# Patient Record
Sex: FEMALE | Race: BLACK OR AFRICAN AMERICAN | Employment: PART TIME | URBAN - METROPOLITAN AREA
[De-identification: names, ages, dates, MRNs, and addresses within clinical notes are randomized per-mention and may not be internally consistent; named-entity substitution may affect disease eponyms.]

---

## 2021-07-25 ENCOUNTER — HOSPITAL ENCOUNTER (EMERGENCY)
Age: 26
Discharge: HOME | End: 2021-07-25
Attending: EMERGENCY MEDICINE
Payer: COMMERCIAL

## 2021-07-25 ENCOUNTER — APPOINTMENT (OUTPATIENT)
Dept: CT IMAGING | Age: 26
End: 2021-07-25
Payer: COMMERCIAL

## 2021-07-25 ENCOUNTER — APPOINTMENT (OUTPATIENT)
Dept: GENERAL RADIOLOGY | Age: 26
End: 2021-07-25
Payer: COMMERCIAL

## 2021-07-25 VITALS
HEART RATE: 73 BPM | HEIGHT: 67 IN | DIASTOLIC BLOOD PRESSURE: 63 MMHG | RESPIRATION RATE: 20 BRPM | TEMPERATURE: 98.1 F | SYSTOLIC BLOOD PRESSURE: 105 MMHG | OXYGEN SATURATION: 99 %

## 2021-07-25 DIAGNOSIS — D57.00 SICKLE CELL PAIN CRISIS (HCC): Primary | ICD-10-CM

## 2021-07-25 DIAGNOSIS — R07.9 CHEST PAIN OF UNCERTAIN ETIOLOGY: ICD-10-CM

## 2021-07-25 LAB
A/G RATIO: 1.2 (ref 1.1–2.2)
ALBUMIN SERPL-MCNC: 4.2 G/DL (ref 3.4–5)
ALP BLD-CCNC: 38 U/L (ref 40–129)
ALT SERPL-CCNC: 6 U/L (ref 10–40)
ANION GAP SERPL CALCULATED.3IONS-SCNC: 10 MMOL/L (ref 3–16)
AST SERPL-CCNC: 11 U/L (ref 15–37)
BILIRUB SERPL-MCNC: 1.5 MG/DL (ref 0–1)
BUN BLDV-MCNC: 7 MG/DL (ref 7–20)
CALCIUM SERPL-MCNC: 9 MG/DL (ref 8.3–10.6)
CHLORIDE BLD-SCNC: 103 MMOL/L (ref 99–110)
CO2: 25 MMOL/L (ref 21–32)
CREAT SERPL-MCNC: 0.8 MG/DL (ref 0.6–1.1)
GFR AFRICAN AMERICAN: >60
GFR NON-AFRICAN AMERICAN: >60
GLOBULIN: 3.5 G/DL
GLUCOSE BLD-MCNC: 93 MG/DL (ref 70–99)
HCG QUALITATIVE: NEGATIVE
HCT VFR BLD CALC: 31.4 % (ref 36–48)
IMMATURE RETIC FRACT: 0.64 (ref 0.21–0.37)
LACTATE DEHYDROGENASE: 140 U/L (ref 100–190)
POTASSIUM SERPL-SCNC: 3.5 MMOL/L (ref 3.5–5.1)
RETICULOCYTE ABSOLUTE COUNT: 0.23 M/UL (ref 0.02–0.1)
RETICULOCYTE COUNT PCT: 6.67 % (ref 0.5–2.18)
SODIUM BLD-SCNC: 138 MMOL/L (ref 136–145)
TOTAL PROTEIN: 7.7 G/DL (ref 6.4–8.2)
TROPONIN: <0.01 NG/ML
TROPONIN: <0.01 NG/ML

## 2021-07-25 PROCEDURE — 85025 COMPLETE CBC W/AUTO DIFF WBC: CPT

## 2021-07-25 PROCEDURE — 6360000004 HC RX CONTRAST MEDICATION: Performed by: EMERGENCY MEDICINE

## 2021-07-25 PROCEDURE — 2580000003 HC RX 258: Performed by: EMERGENCY MEDICINE

## 2021-07-25 PROCEDURE — 85045 AUTOMATED RETICULOCYTE COUNT: CPT

## 2021-07-25 PROCEDURE — 96374 THER/PROPH/DIAG INJ IV PUSH: CPT

## 2021-07-25 PROCEDURE — 36415 COLL VENOUS BLD VENIPUNCTURE: CPT

## 2021-07-25 PROCEDURE — 71046 X-RAY EXAM CHEST 2 VIEWS: CPT

## 2021-07-25 PROCEDURE — 99284 EMERGENCY DEPT VISIT MOD MDM: CPT

## 2021-07-25 PROCEDURE — 6360000002 HC RX W HCPCS: Performed by: EMERGENCY MEDICINE

## 2021-07-25 PROCEDURE — 84484 ASSAY OF TROPONIN QUANT: CPT

## 2021-07-25 PROCEDURE — 71260 CT THORAX DX C+: CPT

## 2021-07-25 PROCEDURE — 93005 ELECTROCARDIOGRAM TRACING: CPT | Performed by: EMERGENCY MEDICINE

## 2021-07-25 PROCEDURE — 80053 COMPREHEN METABOLIC PANEL: CPT

## 2021-07-25 PROCEDURE — 84703 CHORIONIC GONADOTROPIN ASSAY: CPT

## 2021-07-25 PROCEDURE — 71275 CT ANGIOGRAPHY CHEST: CPT

## 2021-07-25 PROCEDURE — 83615 LACTATE (LD) (LDH) ENZYME: CPT

## 2021-07-25 RX ORDER — KETOROLAC TROMETHAMINE 30 MG/ML
15 INJECTION, SOLUTION INTRAMUSCULAR; INTRAVENOUS ONCE
Status: COMPLETED | OUTPATIENT
Start: 2021-07-25 | End: 2021-07-25

## 2021-07-25 RX ORDER — OXYCODONE HYDROCHLORIDE 5 MG/1
5 TABLET ORAL EVERY 6 HOURS PRN
Qty: 10 TABLET | Refills: 0 | Status: SHIPPED | OUTPATIENT
Start: 2021-07-25 | End: 2021-07-28

## 2021-07-25 RX ORDER — METHYLPREDNISOLONE 4 MG/1
4 TABLET ORAL DAILY
COMMUNITY
Start: 2021-07-22

## 2021-07-25 RX ORDER — 0.9 % SODIUM CHLORIDE 0.9 %
1000 INTRAVENOUS SOLUTION INTRAVENOUS ONCE
Status: COMPLETED | OUTPATIENT
Start: 2021-07-25 | End: 2021-07-25

## 2021-07-25 RX ORDER — OXYCODONE HYDROCHLORIDE 5 MG/1
5 TABLET ORAL EVERY 6 HOURS PRN
Qty: 10 TABLET | Refills: 0 | Status: SHIPPED | OUTPATIENT
Start: 2021-07-25 | End: 2021-07-25 | Stop reason: SDUPTHER

## 2021-07-25 RX ADMIN — SODIUM CHLORIDE 1000 ML: 9 INJECTION, SOLUTION INTRAVENOUS at 13:57

## 2021-07-25 RX ADMIN — KETOROLAC TROMETHAMINE 15 MG: 30 INJECTION, SOLUTION INTRAMUSCULAR; INTRAVENOUS at 13:57

## 2021-07-25 RX ADMIN — IOPAMIDOL 75 ML: 755 INJECTION, SOLUTION INTRAVENOUS at 14:21

## 2021-07-25 ASSESSMENT — ENCOUNTER SYMPTOMS
EYES NEGATIVE: 1
SHORTNESS OF BREATH: 0
ABDOMINAL PAIN: 0

## 2021-07-25 ASSESSMENT — PAIN SCALES - GENERAL
PAINLEVEL_OUTOF10: 8
PAINLEVEL_OUTOF10: 6
PAINLEVEL_OUTOF10: 8

## 2021-07-25 ASSESSMENT — PAIN DESCRIPTION - LOCATION: LOCATION: CHEST

## 2021-07-25 NOTE — ED PROVIDER NOTES
[address] [facility]      Pt Name: Brian Carrion  MRN: [id number]  [place] 1995  Date of evaluation: 7/25/2021  Provider: Richard Young MD    CHIEF COMPLAINT     When I woke up this morning I felt fine and then afterwards I noticed my chest was hurting around 10:15. HISTORY OF PRESENT ILLNESS  (Location/Symptom, Timing/Onset,Context/Setting, Quality, Duration, Modifying Factors, Severity). Note limiting factors. Chief Complaint   Patient presents with    Chest Pain     pt states that she woke up this AM and started having right sided chest pain. Worse with mvmt. Brian Carrion is a 26 y.o. female who presents to the emergency department secondary to concern for chest pain. States this has never happened to her before. Denies any trauma, falls, no new activities recently. Denies any cough, fevers, no known sick contacts. Describes pain as constant throbbing with sharp pains intermittently that are random. The throbbing is worse when she moves around and takes deep breaths. Lives with dad who has been healthy without any symptoms. She has been vaccinated against covid. She states with sickle cell she generally has pain in her legs and arms. Sickle cell doctor is Dr. Negro Preciado at [facility] in [place] who she last had a follow up with in the beginning of the year. She is not on methotrexate. Past medical history noted below, significant for sickle cell. Has had acute chest in the past and been hospitalized previously, has previously required blood transfusions. She moved here during covid and does not have a hematologist or primary care. Denies smoking. Aside from what is stated above denies any other symptoms or modifying factors. Nursing Notes reviewed. REVIEW OF SYSTEMS  (2-9 systems for level 4, 10 or more for level 5)   Review of Systems   Constitutional: Negative for appetite change. HENT: Negative. Eyes: Negative. Respiratory: Negative for shortness of breath (hurts to take a deep breath but no sob). Cardiovascular: Positive for chest pain. Negative for leg swelling. Gastrointestinal: Negative for abdominal pain. Endocrine: Negative. Genitourinary: Negative. Musculoskeletal: Negative for gait problem. Skin: Negative for rash. Neurological: Negative. All other systems reviewed and are negative. PAST MEDICAL HISTORY     Past Medical History:   Diagnosis Date    Sickle cell anemia ([facility] [address]        SURGICALHISTORY     History reviewed. No pertinent surgical history. CURRENT MEDICATIONS       Current Discharge Medication List      CONTINUE these medications which have NOT CHANGED    Details   methylPREDNISolone (MEDROL) 4 MG tablet Take 4 mg by mouth daily            ALLERGIES     Amoxicillin  FAMILY HISTORY     History reviewed. No pertinent family history. SOCIAL HISTORY       Social History     Socioeconomic History    Marital status: Single     Spouse name: None    Number of children: None    Years of education: None    Highest education level: None   Occupational History    None   Tobacco Use    Smoking status: Never Smoker    Smokeless tobacco: Never Used   Substance and Sexual Activity    Alcohol use: Yes     Comment: occ    Drug use: Never    Sexual activity: Not Currently   Other Topics Concern    None   Social History Narrative    None     Social Determinants of Health     Financial Resource Strain:     Difficulty of Paying Living Expenses:    Food Insecurity:     Worried About Running Out of Food in the Last Year:     Ran Out of Food in the Last Year:    Transportation Needs:     Lack of Transportation (Medical):      Lack of Transportation (Non-Medical):    Physical Activity:     Days of Exercise per Week:     Minutes of Exercise per Session:    Stress:     Feeling of Stress :    Social Connections:     Frequency of Communication with Friends and Family:  Frequency of Social Gatherings with Friends and Family:     Attends [employer] Services:     Active Member of Clubs or Organizations:     Attends Club or Organization Meetings:     Marital Status:    Intimate Partner Violence:     Fear of Current or Ex-Partner:     Emotionally Abused:     Physically Abused:     Sexually Abused:      SCREENINGS         PHYSICAL EXAM  (up to 7 for level 4, 8 or more for level 5)   INITIAL VITALS: BP: 106/73, Temp: 98.1 °F (36.7 °C), Pulse: 75, Resp: 18, SpO2: 98 %   Physical Exam  Vitals reviewed. Constitutional:       Appearance: She is not ill-appearing, toxic-appearing or diaphoretic. HENT:      Head: Normocephalic and atraumatic. Right Ear: External ear normal.      Left Ear: External ear normal.   Eyes:      General: No scleral icterus. Right eye: No discharge. Left eye: No discharge. Conjunctiva/sclera: Conjunctivae normal.   Neck:      Trachea: No tracheal deviation. Cardiovascular:      Rate and Rhythm: Normal rate. Pulmonary:      Effort: Pulmonary effort is normal. No respiratory distress. Breath sounds: No wheezing, rhonchi or rales. Abdominal:      General: There is no distension. Tenderness: There is no abdominal tenderness. There is no guarding. Musculoskeletal:      Cervical back: Normal range of motion. No rigidity. Right lower leg: No edema. Left lower leg: No edema. Skin:     General: Skin is dry. Capillary Refill: Capillary refill takes 2 to 3 seconds. Neurological:      General: No focal deficit present. Mental Status: She is alert and oriented to person, place, and time.    Psychiatric:         Mood and Affect: Mood normal.         Behavior: Behavior normal.       DIAGNOSTIC RESULTS   EKG: interpreted by the Emergency Department Physician who either signs or Co-signs this chart in the absence of a cardiologist.  Indication: chest pain  Interpretation: rate 72, rhuthm sinus, axis normal. RI/QRS/QTc wnl. non specigic T wave abnormality noted. No prior EKG available for comparison. RADIOLOGY:   Interpretation per Radiologist below, if available at the time of this note:  CTA PULMONARY W CONTRAST   Final Result   1. No pulmonary embolism. 2. The lungs are clear with no acute finding in the chest.         XR CHEST (2 VW)   Final Result   Negative           LABS:  Labs Reviewed   CBC WITH AUTO DIFFERENTIAL - Abnormal; Notable for the following components:       Result Value    WBC 15.5 (*)     RBC 3.36 (*)     Hemoglobin 10.9 (*)     Hematocrit 30.8 (*)     Neutrophils Absolute 9.0 (*)     Polychromasia Occasional (*)     Target Cells 1+ (*)     Pappenheimer Bodies Occasional (*)     All other components within normal limits    Narrative:     Performed at:  [facility]  [address]   Phone [phone]   COMPREHENSIVE METABOLIC PANEL - Abnormal; Notable for the following components:     Total Bilirubin 1.5 (*)     Alkaline Phosphatase 38 (*)     ALT 6 (*)     AST 11 (*)     All other components within normal limits    Narrative:     Performed at:  [facility]  [address]   Phone [phone]   RETICULOCYTES - Abnormal; Notable for the following components:    Retic Ct Pct 6.67 (*)     Retic Ct Abs 0.227 (*)     Immature Retic Fract 0.64 (*)     Hematocrit 31.4 (*)     All other components within normal limits    Narrative:     Performed at:  [facility]  [address]   Phone [phone]   TROPONIN    Narrative:     Performed at:  [facility]  [address]   Phone [phone]   HCG, SERUM, QUALITATIVE    Narrative:     Performed at:  [facility]  [address]   Phone [phone]   LACTATE DEHYDROGENASE    Narrative:     Performed at:  [facility]  [address] [facility] [address]   Phone [phone]   TROPONIN    Narrative:     Performed at:  [facility]  [address] [facility] [address]   Phone [phone]       EMERGENCY DEPARTMENT COURSE and DIFFERENTIAL DIAGNOSIS/MDM:   Patient was given the following medications:  Orders Placed This Encounter   Medications    ketorolac (TORADOL) injection 15 mg    0.9 % sodium chloride bolus    iopamidol (ISOVUE-370) 76 % injection 75 mL    DISCONTD: oxyCODONE (ROXICODONE) 5 MG immediate release tablet     Sig: Take 1 tablet by mouth every 6 hours as needed for Pain for up to 3 days. WARNING:  May cause drowsiness. May impair ability to operate vehicles or machinery. Do not use in combination with alcohol. Dispense:  10 tablet     Refill:  0    oxyCODONE (ROXICODONE) 5 MG immediate release tablet     Sig: Take 1 tablet by mouth every 6 hours as needed for Pain for up to 3 days. WARNING:  May cause drowsiness. May impair ability to operate vehicles or machinery. Do not use in combination with alcohol. Dispense:  10 tablet     Refill:  0     CONSULTS:  None    INITIAL VITALS: BP: 106/73, Temp: 98.1 °F (36.7 °C), Pulse: 75, Resp: 18, SpO2: 98 %   RECENT VITALS:  BP: 102/63,Temp: 98.1 °F (36.7 °C), Pulse: 66, Resp: 18, SpO2: 99 %     Zachariah Morales is a 26 y.o. female who presents to the emergency department secondary to concern for chest pain in the setting of sickle cell disease. On arrival she is awake, alert, oriented. Vitals are hemodynamically stable. On exam she is mildly dehydrated the cap refill approximately 3 seconds otherwise lungs are clear to auscultation bilaterally, abdomen is benign, no peripheral edema is noted. A peripheral IV was placed, labs were ordered along with an EKG, chest x-ray, Toradol, IV fluids.     EKG without acute ischemic findings. Chest x-ray negative. Labs reveal mild anemia with a hemoglobin 10.9. White count mildly elevated 15.5. Reticulocyte count is appropriate. Given her atypical chest pain with her history she did have a CT scan ordered as well which showed no findings of PE and lungs are clear with no acute findings. Repeat troponin is negative. On reassessment she reported feeling better since receiving the Toradol. She did not need any further pain medication at that time. We went over her labs and her imaging and lack of findings at this time of acute chest syndrome or aplastic crisis. She at home usually only takes nonnarcotic pain medications second however, given her primary care and sickle cell specialist are in [facility] (and she is planning to move back in 2 weeks) my biggest concern for her currently is lack of follow-up in the area. This was discussed with both her and her dad. She reports she can call down to the clinic and follow-up with them via the phone. She also expresses understanding of importance of returning to the emergency department for any new or worsening symptoms or concerns. FINAL IMPRESSION      1. Sickle cell pain crisis [facility])        DISPOSITION/PLAN   DISPOSITION Decision To Discharge 07/25/2021 04:20:18 PM      PATIENT REFERRED TO:  Dr. Rosa Nelson sickle cell clinic  Call   For follow up appointment (telemedicine)      DISCHARGE MEDICATIONS:  Current Discharge Medication List      START taking these medications    Details   oxyCODONE (ROXICODONE) 5 MG immediate release tablet Take 1 tablet by mouth every 6 hours as needed for Pain for up to 3 days. WARNING:  May cause drowsiness. May impair ability to operate vehicles or machinery. Do not use in combination with alcohol.   Qty: 10 tablet, Refills: 0    Associated Diagnoses: Sickle cell pain crisis ([facility] [address]                  (Please note that portions of this note were completed with a voice recognition program. Efforts were made to edit the dictations but occasionally words are mis-transcribed.)    Ernesto Matthews MD (electronically signed)  Attending Emergency Physician       Ernesto Matthews MD  07/25/21 4908

## 2021-07-25 NOTE — ED NOTES
Pt stated her pain level is now a 6. Pt resting in bed, warm blanket applied, tv on, no signs of acute distress.       Juan Suarez RN  07/25/21 4573

## 2021-07-25 NOTE — ED NOTES
Discharge instructions, prescriptions, and follow up care discussed with pt and famiy member. All questions answered and pt verbalized understanding. Pt ambulatory withy steady gait upon discharge, no signs of acute distress.       Juan Lindsey RN  07/25/21 [id number]

## 2021-07-26 LAB
BASOPHILS ABSOLUTE: 0 K/UL (ref 0–0.2)
BASOPHILS RELATIVE PERCENT: 0 %
EKG ATRIAL RATE: 72 BPM
EKG DIAGNOSIS: NORMAL
EKG P AXIS: 37 DEGREES
EKG P-R INTERVAL: 166 MS
EKG Q-T INTERVAL: 354 MS
EKG QRS DURATION: 78 MS
EKG QTC CALCULATION (BAZETT): 387 MS
EKG R AXIS: 75 DEGREES
EKG T AXIS: 37 DEGREES
EKG VENTRICULAR RATE: 72 BPM
EOSINOPHILS ABSOLUTE: 0.3 K/UL (ref 0–0.6)
EOSINOPHILS RELATIVE PERCENT: 2 %
HCT VFR BLD CALC: 30.8 % (ref 36–48)
HEMATOLOGY PATH CONSULT: NORMAL
HEMATOLOGY PATH CONSULT: YES
HEMOGLOBIN: 10.9 G/DL (ref 12–16)
LYMPHOCYTES ABSOLUTE: 5.1 K/UL (ref 1–5.1)
LYMPHOCYTES RELATIVE PERCENT: 33 %
MCH RBC QN AUTO: 32.6 PG (ref 26–34)
MCHC RBC AUTO-ENTMCNC: 35.6 G/DL (ref 31–36)
MCV RBC AUTO: 91.6 FL (ref 80–100)
MONOCYTES ABSOLUTE: 1.1 K/UL (ref 0–1.3)
MONOCYTES RELATIVE PERCENT: 7 %
NEUTROPHILS ABSOLUTE: 9 K/UL (ref 1.7–7.7)
NEUTROPHILS RELATIVE PERCENT: 58 %
PAPPENHEIMER BODIES: ABNORMAL
PDW BLD-RTO: 13.7 % (ref 12.4–15.4)
PLATELET # BLD: 310 K/UL (ref 135–450)
PLATELET SLIDE REVIEW: ADEQUATE
PMV BLD AUTO: 9.8 FL (ref 5–10.5)
POLYCHROMASIA: ABNORMAL
RBC # BLD: 3.36 M/UL (ref 4–5.2)
TARGET CELLS: ABNORMAL
WBC # BLD: 15.5 K/UL (ref 4–11)

## 2021-07-26 PROCEDURE — 93010 ELECTROCARDIOGRAM REPORT: CPT | Performed by: INTERNAL MEDICINE

## 2024-01-05 LAB
ALBUMIN SERPL BCG-MCNC: 4 G/DL (ref 3.5–5.2)
ALP SERPL-CCNC: 65 U/L (ref 40–150)
ALT SERPL W P-5'-P-CCNC: 25 U/L (ref 0–50)
ANION GAP SERPL CALCULATED.3IONS-SCNC: 13 MMOL/L (ref 7–15)
AST SERPL W P-5'-P-CCNC: 42 U/L (ref 0–45)
BASOPHILS # BLD AUTO: 0.1 10E3/UL (ref 0–0.2)
BASOPHILS NFR BLD AUTO: 0 %
BILIRUB SERPL-MCNC: 1.6 MG/DL
BUN SERPL-MCNC: 12.8 MG/DL (ref 6–20)
CALCIUM SERPL-MCNC: 8.8 MG/DL (ref 8.6–10)
CHLORIDE SERPL-SCNC: 98 MMOL/L (ref 98–107)
CREAT SERPL-MCNC: 1.35 MG/DL (ref 0.51–0.95)
DEPRECATED HCO3 PLAS-SCNC: 23 MMOL/L (ref 22–29)
EGFRCR SERPLBLD CKD-EPI 2021: 55 ML/MIN/1.73M2
EOSINOPHIL # BLD AUTO: 0 10E3/UL (ref 0–0.7)
EOSINOPHIL NFR BLD AUTO: 0 %
ERYTHROCYTE [DISTWIDTH] IN BLOOD BY AUTOMATED COUNT: 13.6 % (ref 10–15)
FLUAV RNA SPEC QL NAA+PROBE: NEGATIVE
FLUBV RNA RESP QL NAA+PROBE: NEGATIVE
GLUCOSE SERPL-MCNC: 116 MG/DL (ref 70–99)
HCG INTACT+B SERPL-ACNC: <1 MIU/ML
HCT VFR BLD AUTO: 27.4 % (ref 35–47)
HGB BLD-MCNC: 10 G/DL (ref 11.7–15.7)
IMM GRANULOCYTES # BLD: 0.4 10E3/UL
IMM GRANULOCYTES NFR BLD: 1 %
LYMPHOCYTES # BLD AUTO: 2.8 10E3/UL (ref 0.8–5.3)
LYMPHOCYTES NFR BLD AUTO: 8 %
MCH RBC QN AUTO: 31 PG (ref 26.5–33)
MCHC RBC AUTO-ENTMCNC: 36.5 G/DL (ref 31.5–36.5)
MCV RBC AUTO: 85 FL (ref 78–100)
MONOCYTES # BLD AUTO: 5 10E3/UL (ref 0–1.3)
MONOCYTES NFR BLD AUTO: 15 %
NEUTROPHILS # BLD AUTO: 25.8 10E3/UL (ref 1.6–8.3)
NEUTROPHILS NFR BLD AUTO: 76 %
NRBC # BLD AUTO: 0 10E3/UL
NRBC BLD AUTO-RTO: 0 /100
PLATELET # BLD AUTO: 345 10E3/UL (ref 150–450)
POTASSIUM SERPL-SCNC: 4.1 MMOL/L (ref 3.4–5.3)
PROT SERPL-MCNC: 8 G/DL (ref 6.4–8.3)
RBC # BLD AUTO: 3.23 10E6/UL (ref 3.8–5.2)
RSV RNA SPEC NAA+PROBE: NEGATIVE
SARS-COV-2 RNA RESP QL NAA+PROBE: NEGATIVE
SODIUM SERPL-SCNC: 134 MMOL/L (ref 135–145)
TROPONIN T SERPL HS-MCNC: <6 NG/L
WBC # BLD AUTO: 33.9 10E3/UL (ref 4–11)

## 2024-01-05 PROCEDURE — 87637 SARSCOV2&INF A&B&RSV AMP PRB: CPT | Performed by: EMERGENCY MEDICINE

## 2024-01-05 PROCEDURE — 80053 COMPREHEN METABOLIC PANEL: CPT | Performed by: EMERGENCY MEDICINE

## 2024-01-05 PROCEDURE — 99292 CRITICAL CARE ADDL 30 MIN: CPT

## 2024-01-05 PROCEDURE — 250N000013 HC RX MED GY IP 250 OP 250 PS 637: Performed by: EMERGENCY MEDICINE

## 2024-01-05 PROCEDURE — 96368 THER/DIAG CONCURRENT INF: CPT

## 2024-01-05 PROCEDURE — 83550 IRON BINDING TEST: CPT | Performed by: INTERNAL MEDICINE

## 2024-01-05 PROCEDURE — 36415 COLL VENOUS BLD VENIPUNCTURE: CPT | Performed by: EMERGENCY MEDICINE

## 2024-01-05 PROCEDURE — 96361 HYDRATE IV INFUSION ADD-ON: CPT

## 2024-01-05 PROCEDURE — 96365 THER/PROPH/DIAG IV INF INIT: CPT

## 2024-01-05 PROCEDURE — 86900 BLOOD TYPING SEROLOGIC ABO: CPT | Performed by: EMERGENCY MEDICINE

## 2024-01-05 PROCEDURE — 99291 CRITICAL CARE FIRST HOUR: CPT | Mod: 25

## 2024-01-05 PROCEDURE — 93005 ELECTROCARDIOGRAM TRACING: CPT

## 2024-01-05 PROCEDURE — 258N000003 HC RX IP 258 OP 636: Performed by: EMERGENCY MEDICINE

## 2024-01-05 PROCEDURE — 82728 ASSAY OF FERRITIN: CPT | Performed by: INTERNAL MEDICINE

## 2024-01-05 PROCEDURE — 84484 ASSAY OF TROPONIN QUANT: CPT | Performed by: EMERGENCY MEDICINE

## 2024-01-05 PROCEDURE — 96375 TX/PRO/DX INJ NEW DRUG ADDON: CPT

## 2024-01-05 PROCEDURE — 85025 COMPLETE CBC W/AUTO DIFF WBC: CPT | Performed by: EMERGENCY MEDICINE

## 2024-01-05 PROCEDURE — 84702 CHORIONIC GONADOTROPIN TEST: CPT | Performed by: EMERGENCY MEDICINE

## 2024-01-05 RX ORDER — IBUPROFEN 600 MG/1
600 TABLET, FILM COATED ORAL ONCE
Status: COMPLETED | OUTPATIENT
Start: 2024-01-05 | End: 2024-01-05

## 2024-01-05 RX ADMIN — SODIUM CHLORIDE 1000 ML: 9 INJECTION, SOLUTION INTRAVENOUS at 21:22

## 2024-01-05 RX ADMIN — IBUPROFEN 600 MG: 600 TABLET ORAL at 21:26

## 2024-01-06 ENCOUNTER — APPOINTMENT (OUTPATIENT)
Dept: CT IMAGING | Facility: CLINIC | Age: 29
DRG: 871 | End: 2024-01-06
Attending: EMERGENCY MEDICINE
Payer: COMMERCIAL

## 2024-01-06 ENCOUNTER — APPOINTMENT (OUTPATIENT)
Dept: GENERAL RADIOLOGY | Facility: CLINIC | Age: 29
DRG: 871 | End: 2024-01-06
Attending: EMERGENCY MEDICINE
Payer: COMMERCIAL

## 2024-01-06 ENCOUNTER — HOSPITAL ENCOUNTER (INPATIENT)
Facility: CLINIC | Age: 29
LOS: 4 days | Discharge: HOME | DRG: 872 | End: 2024-01-10
Attending: INTERNAL MEDICINE | Admitting: INTERNAL MEDICINE
Payer: COMMERCIAL

## 2024-01-06 ENCOUNTER — HOSPITAL ENCOUNTER (INPATIENT)
Facility: CLINIC | Age: 29
LOS: 1 days | Discharge: ANOTHER HOSPITAL | DRG: 871 | End: 2024-01-06
Attending: EMERGENCY MEDICINE | Admitting: INTERNAL MEDICINE
Payer: COMMERCIAL

## 2024-01-06 VITALS
BODY MASS INDEX: 19.3 KG/M2 | DIASTOLIC BLOOD PRESSURE: 67 MMHG | HEIGHT: 67 IN | RESPIRATION RATE: 8 BRPM | TEMPERATURE: 102.4 F | OXYGEN SATURATION: 94 % | HEART RATE: 131 BPM | SYSTOLIC BLOOD PRESSURE: 99 MMHG | WEIGHT: 123 LBS

## 2024-01-06 DIAGNOSIS — K59.03 DRUG-INDUCED CONSTIPATION: ICD-10-CM

## 2024-01-06 DIAGNOSIS — E61.1 IRON DEFICIENCY: ICD-10-CM

## 2024-01-06 DIAGNOSIS — B37.31 CANDIDIASIS OF VAGINA: ICD-10-CM

## 2024-01-06 DIAGNOSIS — D72.829 LEUKOCYTOSIS, UNSPECIFIED TYPE: ICD-10-CM

## 2024-01-06 DIAGNOSIS — D57.00 SICKLE CELL DISEASE WITH CRISIS (H): Primary | ICD-10-CM

## 2024-01-06 DIAGNOSIS — N10 ACUTE PYELONEPHRITIS: Primary | ICD-10-CM

## 2024-01-06 DIAGNOSIS — A41.9 SEPSIS, DUE TO UNSPECIFIED ORGANISM, UNSPECIFIED WHETHER ACUTE ORGAN DYSFUNCTION PRESENT (H): ICD-10-CM

## 2024-01-06 DIAGNOSIS — N10 ACUTE PYELONEPHRITIS: ICD-10-CM

## 2024-01-06 DIAGNOSIS — D57.00 SICKLE CELL DISEASE WITH CRISIS (H): ICD-10-CM

## 2024-01-06 PROBLEM — R65.20 SEVERE SEPSIS (H): Status: ACTIVE | Noted: 2024-01-06

## 2024-01-06 LAB
ABO/RH(D): NORMAL
ALBUMIN UR-MCNC: 30 MG/DL
ANION GAP SERPL CALCULATED.3IONS-SCNC: 4 MMOL/L (ref 7–15)
ANTIBODY SCREEN: NEGATIVE
APPEARANCE UR: ABNORMAL
ATRIAL RATE - MUSE: 111 BPM
ATRIAL RATE - MUSE: 132 BPM
BILIRUB UR QL STRIP: NEGATIVE
BUN SERPL-MCNC: 8.6 MG/DL (ref 6–20)
CALCIUM SERPL-MCNC: 7.8 MG/DL (ref 8.6–10)
CHLORIDE SERPL-SCNC: 109 MMOL/L (ref 98–107)
COLOR UR AUTO: ABNORMAL
CREAT SERPL-MCNC: 1.19 MG/DL (ref 0.51–0.95)
DEPRECATED HCO3 PLAS-SCNC: 22 MMOL/L (ref 22–29)
DIASTOLIC BLOOD PRESSURE - MUSE: NORMAL MMHG
DIASTOLIC BLOOD PRESSURE - MUSE: NORMAL MMHG
EGFRCR SERPLBLD CKD-EPI 2021: 64 ML/MIN/1.73M2
ERYTHROCYTE [DISTWIDTH] IN BLOOD BY AUTOMATED COUNT: 14 % (ref 10–15)
FERRITIN SERPL-MCNC: 522 NG/ML (ref 6–175)
GLUCOSE BLDC GLUCOMTR-MCNC: 122 MG/DL (ref 70–99)
GLUCOSE BLDC GLUCOMTR-MCNC: 134 MG/DL (ref 70–99)
GLUCOSE SERPL-MCNC: 126 MG/DL (ref 70–99)
GLUCOSE UR STRIP-MCNC: NEGATIVE MG/DL
HCO3 BLDV-SCNC: 18 MMOL/L (ref 21–28)
HCO3 BLDV-SCNC: 22 MMOL/L (ref 21–28)
HCT VFR BLD AUTO: 22.2 % (ref 35–47)
HGB BLD-MCNC: 8.2 G/DL (ref 11.7–15.7)
HGB UR QL STRIP: ABNORMAL
INTERPRETATION ECG - MUSE: NORMAL
INTERPRETATION ECG - MUSE: NORMAL
IRON BINDING CAPACITY (ROCHE): ABNORMAL
IRON SATN MFR SERPL: ABNORMAL %
IRON SERPL-MCNC: 15 UG/DL (ref 37–145)
KETONES UR STRIP-MCNC: 10 MG/DL
LACTATE BLD-SCNC: 0.7 MMOL/L
LACTATE BLD-SCNC: 0.9 MMOL/L
LACTATE SERPL-SCNC: 0.7 MMOL/L (ref 0.7–2)
LEUKOCYTE ESTERASE UR QL STRIP: ABNORMAL
MCH RBC QN AUTO: 30.9 PG (ref 26.5–33)
MCHC RBC AUTO-ENTMCNC: 36.9 G/DL (ref 31.5–36.5)
MCV RBC AUTO: 84 FL (ref 78–100)
MONOCYTES NFR BLD AUTO: NEGATIVE %
MUCOUS THREADS #/AREA URNS LPF: PRESENT /LPF
NITRATE UR QL: NEGATIVE
P AXIS - MUSE: 63 DEGREES
P AXIS - MUSE: 72 DEGREES
PCO2 BLDV: 30 MM HG (ref 40–50)
PCO2 BLDV: 38 MM HG (ref 40–50)
PH BLDV: 7.37 [PH] (ref 7.32–7.43)
PH BLDV: 7.39 [PH] (ref 7.32–7.43)
PH UR STRIP: 5.5 [PH] (ref 5–7)
PLATELET # BLD AUTO: 278 10E3/UL (ref 150–450)
PO2 BLDV: 33 MM HG (ref 25–47)
PO2 BLDV: 44 MM HG (ref 25–47)
POTASSIUM SERPL-SCNC: 3.8 MMOL/L (ref 3.4–5.3)
PR INTERVAL - MUSE: 156 MS
PR INTERVAL - MUSE: 156 MS
QRS DURATION - MUSE: 66 MS
QRS DURATION - MUSE: 74 MS
QT - MUSE: 284 MS
QT - MUSE: 314 MS
QTC - MUSE: 420 MS
QTC - MUSE: 427 MS
R AXIS - MUSE: 102 DEGREES
R AXIS - MUSE: 95 DEGREES
RBC # BLD AUTO: 2.65 10E6/UL (ref 3.8–5.2)
RBC URINE: 6 /HPF
RETICS # AUTO: 0.12 10E6/UL (ref 0.03–0.1)
RETICS/RBC NFR AUTO: 4.7 % (ref 0.5–2)
SAO2 % BLDV: 62 % (ref 94–100)
SAO2 % BLDV: 80 % (ref 94–100)
SODIUM SERPL-SCNC: 135 MMOL/L (ref 135–145)
SP GR UR STRIP: 1.02 (ref 1–1.03)
SPECIMEN EXPIRATION DATE: NORMAL
SQUAMOUS EPITHELIAL: 8 /HPF
SYSTOLIC BLOOD PRESSURE - MUSE: NORMAL MMHG
SYSTOLIC BLOOD PRESSURE - MUSE: NORMAL MMHG
T AXIS - MUSE: -7 DEGREES
T AXIS - MUSE: 55 DEGREES
TROPONIN T SERPL HS-MCNC: 10 NG/L
UROBILINOGEN UR STRIP-MCNC: NORMAL MG/DL
VENTRICULAR RATE- MUSE: 111 BPM
VENTRICULAR RATE- MUSE: 132 BPM
WBC # BLD AUTO: 31.6 10E3/UL (ref 4–11)
WBC CLUMPS #/AREA URNS HPF: PRESENT /HPF
WBC URINE: >182 /HPF

## 2024-01-06 PROCEDURE — 81001 URINALYSIS AUTO W/SCOPE: CPT | Performed by: EMERGENCY MEDICINE

## 2024-01-06 PROCEDURE — 258N000003 HC RX IP 258 OP 636: Performed by: INTERNAL MEDICINE

## 2024-01-06 PROCEDURE — 250N000011 HC RX IP 250 OP 636: Mod: JZ | Performed by: PHYSICIAN ASSISTANT

## 2024-01-06 PROCEDURE — 83605 ASSAY OF LACTIC ACID: CPT | Performed by: PHYSICIAN ASSISTANT

## 2024-01-06 PROCEDURE — 250N000011 HC RX IP 250 OP 636: Performed by: INTERNAL MEDICINE

## 2024-01-06 PROCEDURE — 999N000157 HC STATISTIC RCP TIME EA 10 MIN

## 2024-01-06 PROCEDURE — 250N000011 HC RX IP 250 OP 636: Performed by: PHYSICIAN ASSISTANT

## 2024-01-06 PROCEDURE — 250N000009 HC RX 250: Performed by: EMERGENCY MEDICINE

## 2024-01-06 PROCEDURE — 99207 PR NO BILLABLE SERVICE THIS VISIT: CPT | Performed by: INTERNAL MEDICINE

## 2024-01-06 PROCEDURE — 200N000001 HC R&B ICU

## 2024-01-06 PROCEDURE — 258N000003 HC RX IP 258 OP 636: Performed by: EMERGENCY MEDICINE

## 2024-01-06 PROCEDURE — 82803 BLOOD GASES ANY COMBINATION: CPT

## 2024-01-06 PROCEDURE — 120N000001 HC R&B MED SURG/OB

## 2024-01-06 PROCEDURE — 87186 SC STD MICRODIL/AGAR DIL: CPT | Performed by: EMERGENCY MEDICINE

## 2024-01-06 PROCEDURE — 80048 BASIC METABOLIC PNL TOTAL CA: CPT | Performed by: PHYSICIAN ASSISTANT

## 2024-01-06 PROCEDURE — 71275 CT ANGIOGRAPHY CHEST: CPT

## 2024-01-06 PROCEDURE — 74177 CT ABD & PELVIS W/CONTRAST: CPT

## 2024-01-06 PROCEDURE — 250N000013 HC RX MED GY IP 250 OP 250 PS 637: Performed by: INTERNAL MEDICINE

## 2024-01-06 PROCEDURE — 36415 COLL VENOUS BLD VENIPUNCTURE: CPT | Performed by: PHYSICIAN ASSISTANT

## 2024-01-06 PROCEDURE — 36415 COLL VENOUS BLD VENIPUNCTURE: CPT | Performed by: EMERGENCY MEDICINE

## 2024-01-06 PROCEDURE — 99291 CRITICAL CARE FIRST HOUR: CPT | Performed by: PHYSICIAN ASSISTANT

## 2024-01-06 PROCEDURE — 99233 SBSQ HOSP IP/OBS HIGH 50: CPT | Mod: 25 | Performed by: INTERNAL MEDICINE

## 2024-01-06 PROCEDURE — 87040 BLOOD CULTURE FOR BACTERIA: CPT | Performed by: EMERGENCY MEDICINE

## 2024-01-06 PROCEDURE — 250N000011 HC RX IP 250 OP 636: Performed by: EMERGENCY MEDICINE

## 2024-01-06 PROCEDURE — 85045 AUTOMATED RETICULOCYTE COUNT: CPT | Performed by: INTERNAL MEDICINE

## 2024-01-06 PROCEDURE — 84484 ASSAY OF TROPONIN QUANT: CPT | Performed by: PHYSICIAN ASSISTANT

## 2024-01-06 PROCEDURE — 86308 HETEROPHILE ANTIBODY SCREEN: CPT | Performed by: INTERNAL MEDICINE

## 2024-01-06 PROCEDURE — 87086 URINE CULTURE/COLONY COUNT: CPT | Performed by: EMERGENCY MEDICINE

## 2024-01-06 PROCEDURE — 36415 COLL VENOUS BLD VENIPUNCTURE: CPT | Performed by: INTERNAL MEDICINE

## 2024-01-06 PROCEDURE — 258N000003 HC RX IP 258 OP 636: Performed by: PHYSICIAN ASSISTANT

## 2024-01-06 PROCEDURE — 82962 GLUCOSE BLOOD TEST: CPT

## 2024-01-06 PROCEDURE — 85027 COMPLETE CBC AUTOMATED: CPT | Performed by: INTERNAL MEDICINE

## 2024-01-06 PROCEDURE — 71046 X-RAY EXAM CHEST 2 VIEWS: CPT

## 2024-01-06 RX ORDER — ACETAMINOPHEN 325 MG/1
650 TABLET ORAL EVERY 4 HOURS PRN
Status: DISCONTINUED | OUTPATIENT
Start: 2024-01-06 | End: 2024-01-09

## 2024-01-06 RX ORDER — HYDROMORPHONE HYDROCHLORIDE 1 MG/ML
0.3 INJECTION, SOLUTION INTRAMUSCULAR; INTRAVENOUS; SUBCUTANEOUS
Status: DISCONTINUED | OUTPATIENT
Start: 2024-01-06 | End: 2024-01-06 | Stop reason: HOSPADM

## 2024-01-06 RX ORDER — ROPIVACAINE IN 0.9% SOD CHL/PF 0.1 %
.03-.125 PLASTIC BAG, INJECTION (ML) EPIDURAL CONTINUOUS
Status: DISCONTINUED | OUTPATIENT
Start: 2024-01-06 | End: 2024-01-06 | Stop reason: HOSPADM

## 2024-01-06 RX ORDER — SODIUM CHLORIDE, SODIUM LACTATE, POTASSIUM CHLORIDE, CALCIUM CHLORIDE 600; 310; 30; 20 MG/100ML; MG/100ML; MG/100ML; MG/100ML
INJECTION, SOLUTION INTRAVENOUS CONTINUOUS
Status: DISCONTINUED | OUTPATIENT
Start: 2024-01-06 | End: 2024-01-06 | Stop reason: HOSPADM

## 2024-01-06 RX ORDER — ONDANSETRON 2 MG/ML
4 INJECTION INTRAMUSCULAR; INTRAVENOUS EVERY 6 HOURS PRN
Status: CANCELLED | OUTPATIENT
Start: 2024-01-06

## 2024-01-06 RX ORDER — AZITHROMYCIN 250 MG/1
250 TABLET, FILM COATED ORAL DAILY
Status: CANCELLED | OUTPATIENT
Start: 2024-01-07 | End: 2024-01-11

## 2024-01-06 RX ORDER — CEFTRIAXONE 2 G/1
2 INJECTION, POWDER, FOR SOLUTION INTRAMUSCULAR; INTRAVENOUS ONCE
Status: COMPLETED | OUTPATIENT
Start: 2024-01-06 | End: 2024-01-06

## 2024-01-06 RX ORDER — BISACODYL 10 MG
10 SUPPOSITORY, RECTAL RECTAL DAILY PRN
Status: CANCELLED | OUTPATIENT
Start: 2024-01-06

## 2024-01-06 RX ORDER — NALOXONE HYDROCHLORIDE 0.4 MG/ML
0.4 INJECTION, SOLUTION INTRAMUSCULAR; INTRAVENOUS; SUBCUTANEOUS
Status: DISCONTINUED | OUTPATIENT
Start: 2024-01-06 | End: 2024-01-10 | Stop reason: HOSPADM

## 2024-01-06 RX ORDER — ENOXAPARIN SODIUM 100 MG/ML
40 INJECTION SUBCUTANEOUS EVERY 24 HOURS
Status: DISCONTINUED | OUTPATIENT
Start: 2024-01-06 | End: 2024-01-10 | Stop reason: HOSPADM

## 2024-01-06 RX ORDER — MORPHINE SULFATE 4 MG/ML
4 INJECTION, SOLUTION INTRAMUSCULAR; INTRAVENOUS ONCE
Status: COMPLETED | OUTPATIENT
Start: 2024-01-06 | End: 2024-01-06

## 2024-01-06 RX ORDER — IOPAMIDOL 755 MG/ML
56 INJECTION, SOLUTION INTRAVASCULAR ONCE
Status: COMPLETED | OUTPATIENT
Start: 2024-01-06 | End: 2024-01-06

## 2024-01-06 RX ORDER — LIDOCAINE 40 MG/G
CREAM TOPICAL
Status: CANCELLED | OUTPATIENT
Start: 2024-01-06

## 2024-01-06 RX ORDER — ACETAMINOPHEN 325 MG/1
650 TABLET ORAL EVERY 4 HOURS PRN
Status: DISCONTINUED | OUTPATIENT
Start: 2024-01-06 | End: 2024-01-06

## 2024-01-06 RX ORDER — AMOXICILLIN 250 MG
1 CAPSULE ORAL 2 TIMES DAILY PRN
Status: CANCELLED | OUTPATIENT
Start: 2024-01-06

## 2024-01-06 RX ORDER — AZITHROMYCIN 500 MG/1
500 INJECTION, POWDER, LYOPHILIZED, FOR SOLUTION INTRAVENOUS ONCE
Status: COMPLETED | OUTPATIENT
Start: 2024-01-06 | End: 2024-01-06

## 2024-01-06 RX ORDER — IBUPROFEN 600 MG/1
600 TABLET, FILM COATED ORAL EVERY 6 HOURS PRN
Status: CANCELLED | OUTPATIENT
Start: 2024-01-06

## 2024-01-06 RX ORDER — ONDANSETRON 4 MG/1
4 TABLET, ORALLY DISINTEGRATING ORAL EVERY 6 HOURS PRN
Status: DISCONTINUED | OUTPATIENT
Start: 2024-01-06 | End: 2024-01-10 | Stop reason: HOSPADM

## 2024-01-06 RX ORDER — DEXTROSE MONOHYDRATE 25 G/50ML
25-50 INJECTION, SOLUTION INTRAVENOUS
Status: DISCONTINUED | OUTPATIENT
Start: 2024-01-06 | End: 2024-01-10 | Stop reason: HOSPADM

## 2024-01-06 RX ORDER — NITROGLYCERIN 0.4 MG/1
0.4 TABLET SUBLINGUAL EVERY 5 MIN PRN
Status: DISCONTINUED | OUTPATIENT
Start: 2024-01-06 | End: 2024-01-06 | Stop reason: HOSPADM

## 2024-01-06 RX ORDER — ENOXAPARIN SODIUM 100 MG/ML
40 INJECTION SUBCUTANEOUS EVERY 24 HOURS
Status: CANCELLED | OUTPATIENT
Start: 2024-01-06

## 2024-01-06 RX ORDER — OXYCODONE HYDROCHLORIDE 5 MG/1
10 TABLET ORAL EVERY 4 HOURS PRN
Status: DISCONTINUED | OUTPATIENT
Start: 2024-01-06 | End: 2024-01-06 | Stop reason: HOSPADM

## 2024-01-06 RX ORDER — ACETAMINOPHEN 650 MG/1
650 SUPPOSITORY RECTAL EVERY 4 HOURS PRN
Status: DISCONTINUED | OUTPATIENT
Start: 2024-01-06 | End: 2024-01-06

## 2024-01-06 RX ORDER — HYDROMORPHONE HCL IN WATER/PF 6 MG/30 ML
0.2 PATIENT CONTROLLED ANALGESIA SYRINGE INTRAVENOUS
Status: DISCONTINUED | OUTPATIENT
Start: 2024-01-06 | End: 2024-01-06

## 2024-01-06 RX ORDER — NALOXONE HYDROCHLORIDE 0.4 MG/ML
0.4 INJECTION, SOLUTION INTRAMUSCULAR; INTRAVENOUS; SUBCUTANEOUS
Status: DISCONTINUED | OUTPATIENT
Start: 2024-01-06 | End: 2024-01-06 | Stop reason: HOSPADM

## 2024-01-06 RX ORDER — SODIUM CHLORIDE 9 MG/ML
INJECTION, SOLUTION INTRAVENOUS CONTINUOUS
Status: CANCELLED | OUTPATIENT
Start: 2024-01-06

## 2024-01-06 RX ORDER — NALOXONE HYDROCHLORIDE 0.4 MG/ML
0.2 INJECTION, SOLUTION INTRAMUSCULAR; INTRAVENOUS; SUBCUTANEOUS
Status: DISCONTINUED | OUTPATIENT
Start: 2024-01-06 | End: 2024-01-10 | Stop reason: HOSPADM

## 2024-01-06 RX ORDER — HYDROMORPHONE HCL IN WATER/PF 6 MG/30 ML
0.4 PATIENT CONTROLLED ANALGESIA SYRINGE INTRAVENOUS
Status: CANCELLED | OUTPATIENT
Start: 2024-01-06

## 2024-01-06 RX ORDER — ONDANSETRON 2 MG/ML
4 INJECTION INTRAMUSCULAR; INTRAVENOUS EVERY 6 HOURS PRN
Status: DISCONTINUED | OUTPATIENT
Start: 2024-01-06 | End: 2024-01-10 | Stop reason: HOSPADM

## 2024-01-06 RX ORDER — METRONIDAZOLE 500 MG/100ML
500 INJECTION, SOLUTION INTRAVENOUS EVERY 12 HOURS
Status: ON HOLD
Start: 2024-01-06 | End: 2024-01-10

## 2024-01-06 RX ORDER — CEFTRIAXONE 2 G/1
2 INJECTION, POWDER, FOR SOLUTION INTRAMUSCULAR; INTRAVENOUS EVERY 24 HOURS
Status: DISCONTINUED | OUTPATIENT
Start: 2024-01-06 | End: 2024-01-06

## 2024-01-06 RX ORDER — AMOXICILLIN 250 MG
1 CAPSULE ORAL 2 TIMES DAILY PRN
Status: DISCONTINUED | OUTPATIENT
Start: 2024-01-06 | End: 2024-01-10 | Stop reason: HOSPADM

## 2024-01-06 RX ORDER — OXYCODONE HYDROCHLORIDE 5 MG/1
5 TABLET ORAL EVERY 4 HOURS PRN
Status: DISCONTINUED | OUTPATIENT
Start: 2024-01-06 | End: 2024-01-06

## 2024-01-06 RX ORDER — ACETAMINOPHEN 325 MG/1
650 TABLET ORAL EVERY 4 HOURS PRN
Status: CANCELLED | OUTPATIENT
Start: 2024-01-06

## 2024-01-06 RX ORDER — PROCHLORPERAZINE 25 MG
25 SUPPOSITORY, RECTAL RECTAL EVERY 12 HOURS PRN
Status: DISCONTINUED | OUTPATIENT
Start: 2024-01-06 | End: 2024-01-06 | Stop reason: HOSPADM

## 2024-01-06 RX ORDER — AMOXICILLIN 250 MG
2 CAPSULE ORAL 2 TIMES DAILY PRN
Status: DISCONTINUED | OUTPATIENT
Start: 2024-01-06 | End: 2024-01-10 | Stop reason: HOSPADM

## 2024-01-06 RX ORDER — MEROPENEM 1 G/1
1 INJECTION, POWDER, FOR SOLUTION INTRAVENOUS EVERY 8 HOURS
Status: DISCONTINUED | OUTPATIENT
Start: 2024-01-06 | End: 2024-01-06

## 2024-01-06 RX ORDER — HYDROMORPHONE HCL IN WATER/PF 6 MG/30 ML
0.2 PATIENT CONTROLLED ANALGESIA SYRINGE INTRAVENOUS
Status: CANCELLED | OUTPATIENT
Start: 2024-01-06

## 2024-01-06 RX ORDER — OXYCODONE HYDROCHLORIDE 5 MG/1
5 TABLET ORAL EVERY 4 HOURS PRN
Status: DISCONTINUED | OUTPATIENT
Start: 2024-01-06 | End: 2024-01-09

## 2024-01-06 RX ORDER — IOPAMIDOL 755 MG/ML
62 INJECTION, SOLUTION INTRAVASCULAR ONCE
Status: COMPLETED | OUTPATIENT
Start: 2024-01-06 | End: 2024-01-06

## 2024-01-06 RX ORDER — SODIUM CHLORIDE, SODIUM LACTATE, POTASSIUM CHLORIDE, CALCIUM CHLORIDE 600; 310; 30; 20 MG/100ML; MG/100ML; MG/100ML; MG/100ML
INJECTION, SOLUTION INTRAVENOUS CONTINUOUS
Status: DISCONTINUED | OUTPATIENT
Start: 2024-01-06 | End: 2024-01-07

## 2024-01-06 RX ORDER — LORATADINE 10 MG/1
10 TABLET ORAL DAILY
COMMUNITY

## 2024-01-06 RX ORDER — PROCHLORPERAZINE MALEATE 10 MG
10 TABLET ORAL EVERY 6 HOURS PRN
Status: DISCONTINUED | OUTPATIENT
Start: 2024-01-06 | End: 2024-01-06 | Stop reason: HOSPADM

## 2024-01-06 RX ORDER — NALOXONE HYDROCHLORIDE 0.4 MG/ML
0.2 INJECTION, SOLUTION INTRAMUSCULAR; INTRAVENOUS; SUBCUTANEOUS
Status: DISCONTINUED | OUTPATIENT
Start: 2024-01-06 | End: 2024-01-06 | Stop reason: HOSPADM

## 2024-01-06 RX ORDER — CEFEPIME HYDROCHLORIDE 2 G/1
2 INJECTION, POWDER, FOR SOLUTION INTRAVENOUS EVERY 12 HOURS
Status: DISCONTINUED | OUTPATIENT
Start: 2024-01-07 | End: 2024-01-07

## 2024-01-06 RX ORDER — BISACODYL 10 MG
10 SUPPOSITORY, RECTAL RECTAL DAILY PRN
Status: DISCONTINUED | OUTPATIENT
Start: 2024-01-06 | End: 2024-01-10 | Stop reason: HOSPADM

## 2024-01-06 RX ORDER — CEFEPIME HYDROCHLORIDE 2 G/1
2 INJECTION, POWDER, FOR SOLUTION INTRAVENOUS EVERY 12 HOURS
Status: ON HOLD
Start: 2024-01-06 | End: 2024-01-10

## 2024-01-06 RX ORDER — OXYCODONE HYDROCHLORIDE 5 MG/1
5 TABLET ORAL EVERY 4 HOURS PRN
Status: DISCONTINUED | OUTPATIENT
Start: 2024-01-06 | End: 2024-01-06 | Stop reason: HOSPADM

## 2024-01-06 RX ORDER — NICOTINE POLACRILEX 4 MG
15-30 LOZENGE BUCCAL
Status: DISCONTINUED | OUTPATIENT
Start: 2024-01-06 | End: 2024-01-10 | Stop reason: HOSPADM

## 2024-01-06 RX ORDER — CEFEPIME HYDROCHLORIDE 2 G/1
2 INJECTION, POWDER, FOR SOLUTION INTRAVENOUS EVERY 12 HOURS
Status: DISCONTINUED | OUTPATIENT
Start: 2024-01-06 | End: 2024-01-06 | Stop reason: HOSPADM

## 2024-01-06 RX ORDER — ACETAMINOPHEN 650 MG/1
650 SUPPOSITORY RECTAL EVERY 4 HOURS PRN
Status: DISCONTINUED | OUTPATIENT
Start: 2024-01-06 | End: 2024-01-06 | Stop reason: HOSPADM

## 2024-01-06 RX ORDER — CALCIUM CITRATE/VITAMIN D3 200MG-6.25
1 TABLET ORAL DAILY
COMMUNITY

## 2024-01-06 RX ORDER — ACETAMINOPHEN 325 MG/1
650 TABLET ORAL EVERY 4 HOURS PRN
Status: DISCONTINUED | OUTPATIENT
Start: 2024-01-06 | End: 2024-01-06 | Stop reason: HOSPADM

## 2024-01-06 RX ORDER — PROCHLORPERAZINE MALEATE 10 MG
10 TABLET ORAL EVERY 6 HOURS PRN
Status: DISCONTINUED | OUTPATIENT
Start: 2024-01-06 | End: 2024-01-10 | Stop reason: HOSPADM

## 2024-01-06 RX ORDER — METRONIDAZOLE 500 MG/100ML
500 INJECTION, SOLUTION INTRAVENOUS EVERY 12 HOURS
Status: DISCONTINUED | OUTPATIENT
Start: 2024-01-06 | End: 2024-01-06 | Stop reason: HOSPADM

## 2024-01-06 RX ORDER — PROCHLORPERAZINE 25 MG
25 SUPPOSITORY, RECTAL RECTAL EVERY 12 HOURS PRN
Status: DISCONTINUED | OUTPATIENT
Start: 2024-01-06 | End: 2024-01-10 | Stop reason: HOSPADM

## 2024-01-06 RX ORDER — POLYETHYLENE GLYCOL 3350 17 G/17G
17 POWDER, FOR SOLUTION ORAL 2 TIMES DAILY PRN
Status: DISCONTINUED | OUTPATIENT
Start: 2024-01-06 | End: 2024-01-06 | Stop reason: HOSPADM

## 2024-01-06 RX ORDER — ACETAMINOPHEN 325 MG/10.15ML
650 LIQUID ORAL EVERY 4 HOURS PRN
Status: DISCONTINUED | OUTPATIENT
Start: 2024-01-06 | End: 2024-01-09

## 2024-01-06 RX ORDER — SODIUM CHLORIDE 9 MG/ML
INJECTION, SOLUTION INTRAVENOUS CONTINUOUS
Status: DISCONTINUED | OUTPATIENT
Start: 2024-01-06 | End: 2024-01-06 | Stop reason: HOSPADM

## 2024-01-06 RX ORDER — ACETAMINOPHEN 650 MG/1
650 SUPPOSITORY RECTAL EVERY 4 HOURS PRN
Status: CANCELLED | OUTPATIENT
Start: 2024-01-06

## 2024-01-06 RX ORDER — HYDROMORPHONE HYDROCHLORIDE 1 MG/ML
0.3 INJECTION, SOLUTION INTRAMUSCULAR; INTRAVENOUS; SUBCUTANEOUS
Status: DISCONTINUED | OUTPATIENT
Start: 2024-01-06 | End: 2024-01-09

## 2024-01-06 RX ORDER — AMOXICILLIN 250 MG
2 CAPSULE ORAL 2 TIMES DAILY PRN
Status: CANCELLED | OUTPATIENT
Start: 2024-01-06

## 2024-01-06 RX ORDER — ONDANSETRON 4 MG/1
4 TABLET, ORALLY DISINTEGRATING ORAL EVERY 6 HOURS PRN
Status: CANCELLED | OUTPATIENT
Start: 2024-01-06

## 2024-01-06 RX ADMIN — SODIUM CHLORIDE: 9 INJECTION, SOLUTION INTRAVENOUS at 09:43

## 2024-01-06 RX ADMIN — CEFEPIME 2 G: 2 INJECTION, POWDER, FOR SOLUTION INTRAVENOUS at 12:02

## 2024-01-06 RX ADMIN — PROCHLORPERAZINE MALEATE 10 MG: 10 TABLET ORAL at 09:08

## 2024-01-06 RX ADMIN — MORPHINE SULFATE 4 MG: 4 INJECTION, SOLUTION INTRAMUSCULAR; INTRAVENOUS at 02:41

## 2024-01-06 RX ADMIN — HYDROMORPHONE HYDROCHLORIDE 0.3 MG: 1 INJECTION, SOLUTION INTRAMUSCULAR; INTRAVENOUS; SUBCUTANEOUS at 16:49

## 2024-01-06 RX ADMIN — SODIUM CHLORIDE 60 ML: 9 INJECTION, SOLUTION INTRAVENOUS at 04:07

## 2024-01-06 RX ADMIN — CEFTRIAXONE SODIUM 2 G: 2 INJECTION, POWDER, FOR SOLUTION INTRAMUSCULAR; INTRAVENOUS at 01:32

## 2024-01-06 RX ADMIN — SODIUM CHLORIDE 1000 ML: 9 INJECTION, SOLUTION INTRAVENOUS at 03:49

## 2024-01-06 RX ADMIN — PROCHLORPERAZINE MALEATE 10 MG: 10 TABLET ORAL at 14:27

## 2024-01-06 RX ADMIN — IOPAMIDOL 56 ML: 755 INJECTION, SOLUTION INTRAVENOUS at 01:56

## 2024-01-06 RX ADMIN — AZITHROMYCIN MONOHYDRATE 500 MG: 500 INJECTION, POWDER, LYOPHILIZED, FOR SOLUTION INTRAVENOUS at 02:42

## 2024-01-06 RX ADMIN — SODIUM CHLORIDE 500 ML: 9 INJECTION, SOLUTION INTRAVENOUS at 11:21

## 2024-01-06 RX ADMIN — ACETAMINOPHEN 650 MG: 325 TABLET, FILM COATED ORAL at 20:25

## 2024-01-06 RX ADMIN — ACETAMINOPHEN 650 MG: 325 TABLET, FILM COATED ORAL at 09:47

## 2024-01-06 RX ADMIN — METRONIDAZOLE 500 MG: 500 INJECTION, SOLUTION INTRAVENOUS at 13:41

## 2024-01-06 RX ADMIN — SODIUM CHLORIDE 83 ML: 9 INJECTION, SOLUTION INTRAVENOUS at 01:56

## 2024-01-06 RX ADMIN — IOPAMIDOL 62 ML: 755 INJECTION, SOLUTION INTRAVENOUS at 04:07

## 2024-01-06 RX ADMIN — ACETAMINOPHEN 650 MG: 325 TABLET, FILM COATED ORAL at 03:45

## 2024-01-06 RX ADMIN — HYDROMORPHONE HYDROCHLORIDE 0.2 MG: 0.2 INJECTION, SOLUTION INTRAMUSCULAR; INTRAVENOUS; SUBCUTANEOUS at 10:56

## 2024-01-06 RX ADMIN — SODIUM CHLORIDE 1000 ML: 9 INJECTION, SOLUTION INTRAVENOUS at 01:17

## 2024-01-06 RX ADMIN — OXYCODONE HYDROCHLORIDE 5 MG: 5 TABLET ORAL at 09:08

## 2024-01-06 RX ADMIN — SODIUM CHLORIDE, POTASSIUM CHLORIDE, SODIUM LACTATE AND CALCIUM CHLORIDE: 600; 310; 30; 20 INJECTION, SOLUTION INTRAVENOUS at 16:49

## 2024-01-06 RX ADMIN — SODIUM CHLORIDE, POTASSIUM CHLORIDE, SODIUM LACTATE AND CALCIUM CHLORIDE: 600; 310; 30; 20 INJECTION, SOLUTION INTRAVENOUS at 11:27

## 2024-01-06 RX ADMIN — SODIUM CHLORIDE, POTASSIUM CHLORIDE, SODIUM LACTATE AND CALCIUM CHLORIDE: 600; 310; 30; 20 INJECTION, SOLUTION INTRAVENOUS at 20:27

## 2024-01-06 RX ADMIN — OXYCODONE HYDROCHLORIDE 5 MG: 5 TABLET ORAL at 20:25

## 2024-01-06 RX ADMIN — ENOXAPARIN SODIUM 40 MG: 40 INJECTION SUBCUTANEOUS at 16:49

## 2024-01-06 ASSESSMENT — ACTIVITIES OF DAILY LIVING (ADL)
ADLS_ACUITY_SCORE: 35

## 2024-01-06 NOTE — DISCHARGE SUMMARY
[facility]    Discharge Summary  Hospitalist    Date of Admission:  1/6/2024  Date of Discharge:  1/6/2024  Discharging Provider: Fermin Simms MD    Discharge Diagnoses     Acute bilateral pyelonephritis  Severe sepsis due to above  Possible sickle cell crisis  Acute kidney injury      Hospital Course:    Bridgett Ugarte is a 28 year old female admitted on 1/6/2024. She presents to the emergency department with rigors and myalgias as well as fevers since Thursday.  Found to be tachycardic with abdominal pain.  CT imaging demonstrated bilateral pyelonephritis     Acute bilateral pyelonephritis  Severe sepsis due to above  -patient with bilateral flank pain, fever and rigors for about 48 hours.  Grossly abnormal urinalysis, right greater than left CVA tenderness and CT imaging with findings concerning for bilateral pyelonephritis.  Leukocytosis of 33,900, tachycardia into the 140s range, fever of 103.1  -Initiated on IV ceftriaxone 2 g daily  -Awaiting urine and blood culture  -Despite adequate fluid resuscitation, patient continued to be hypotensive with blood pressure in the 70s, raising concern for possible septic shock and I was concerned about ongoing deterioration.  -I recommended admission to intensive care unit bed to monitor her closely at least for the next 24 hours and also for potentially starting pressors.  We do not have any ICU beds at [facility] therefore patient will be transferring to the [facility] intensive care unit.  -Prior to transfer patient had an RRT for hypotension, antibiotics broadened to cefepime and metronidazole   -recommend ID consult    HbSC with possible sickle cell crisis  -Not clear that patient is currently in a sickle cell pain crisis.  Patient has significant nonspecific pain including chest pain, epigastric pain and bodyaches although all of these findings could be due to severe sepsis from above.   -Continue aggressive IV  hydration  -Transfuse as needed  -Hematology consulted, however patient will now be transferring to [facility] as mentioned above.    Acute kidney injury: Suspected.  Creatinine 1.35.  -IV volume resuscitation, trend BMP.      Fermin Simms MD, MD    Significant Results and Procedures   See below    Pending Results   Unresulted Labs Ordered in the Past 30 Days of this Admission       Date and Time Order Name Status Description    1/6/2024  3:50 AM Urine Culture In process     1/6/2024 12:48 AM Blood Culture Peripheral Blood In process     1/6/2024 12:48 AM Blood Culture Peripheral Blood In process             Code Status   Full Code       Primary Care Physician   Physician No Ref-Primary    Physical Exam   Temp: (!) 102  F (38.9  C) Temp src: Oral BP: 93/49 Pulse: (!) 131   Resp: 14 SpO2: 98 % O2 Device: None (Room air)      Constitutional: AAOX3, NAD, anxious  Respiratory: CTA B/L, Normal WOB, No crackles or wheezes  Cardiovascular: Tachycardic, regular  GI: Soft, Non- tender, BS- normoactive, right more than left CVA tenderness  Neuro: CN- grossly intact, moving all 4 extremities    Discharge Disposition   Transferred to [facility], ICU  Condition at discharge: Serious    Consultations This Hospital Stay   HEMATOLOGY & ONCOLOGY IP CONSULT  INFECTIOUS DISEASES IP CONSULT    Time Spent on this Encounter   IFermin MD, personally saw the patient today and spent greater than 30 minutes discharging this patient.    Discharge Orders   No discharge procedures on file.  Discharge Medications   Current Discharge Medication List        CONTINUE these medications which have NOT CHANGED    Details   Multiple Vitamins-Minerals (MULTIVITAMIN GUMMIES WOMENS) CHEW Take 1 Piece by mouth daily           Allergies   Allergies   Allergen Reactions    Amoxicillin Anaphylaxis     Tolerated ceftriaxone (1/6/23)     Data   Most Recent 3 CBC's:  Recent Labs   Lab Test 01/06/24  0805  01/05/24 2114   WBC 31.6* 33.9*   HGB 8.2* 10.0*   MCV 84 85    345      Most Recent 3 BMP's:  Recent Labs   Lab Test 01/05/24 2114   *   POTASSIUM 4.1   CHLORIDE 98   CO2 23   BUN 12.8   CR 1.35*   ANIONGAP 13   TOOTIE 8.8   *     Most Recent 2 LFT's:  Recent Labs   Lab Test 01/05/24 2114   AST 42   ALT 25   ALKPHOS 65   BILITOTAL 1.6*     Most Recent INR's and Anticoagulation Dosing History:  Anticoagulation Dose History           No data to display              Most Recent 3 Troponin's:No lab results found.  Most Recent Cholesterol Panel:No lab results found.  Most Recent 6 Bacteria Isolates From Any Culture (See EPIC Reports for Culture Details):No lab results found.  Most Recent TSH, T4 and A1c Labs:No lab results found.    Results for orders placed or performed during the hospital encounter of 01/06/24   XR Chest 2 Views    Narrative    EXAM: XR CHEST 2 VIEWS  LOCATION: [facility]  DATE: 1/6/2024    INDICATION: cough  COMPARISON: None.      Impression    IMPRESSION: Heart size and pulmonary vascularity normal. The lungs are clear. Mild thoracolumbar spinal curve. Surgical clips right upper quadrant.   CT Chest Pulmonary Embolism w Contrast    Narrative    EXAM: CT CHEST PULMONARY EMBOLISM W CONTRAST  LOCATION: [facility]  DATE: 1/6/2024    INDICATION: cp, sob, sickle cell dz, wbc 33k  COMPARISON: Same day chest radiograph.  TECHNIQUE: CT chest pulmonary angiogram during arterial phase injection of IV contrast. Multiplanar reformats and MIP reconstructions were performed. Dose reduction techniques were used.   CONTRAST: 56 mL Isovue   370    FINDINGS:  ANGIOGRAM CHEST: Pulmonary arteries are normal caliber and negative for pulmonary emboli. Thoracic aorta is negative for dissection. No CT evidence of right heart strain.    LUNGS AND PLEURA: Normal.    MEDIASTINUM/AXILLAE: Normal.    CORONARY ARTERY CALCIFICATION: None.    UPPER ABDOMEN:  Cholecystectomy.    MUSCULOSKELETAL: H shaped vertebral bodies compatible with osseous sequela of sickle cell disease. No other acute bony abnormality.      Impression    IMPRESSION:  1.  No pulmonary embolus or other acute process in the chest.   CT Abdomen Pelvis w Contrast    Narrative    EXAM: CT ABDOMEN PELVIS W CONTRAST  LOCATION: [facility]  DATE: 1/6/2024    INDICATION: Abdominal pain with leukocytosis and right ureters.  COMPARISON: CT 01/06/2024  TECHNIQUE: CT scan of the abdomen and pelvis was performed following injection of IV contrast. Multiplanar reformats were obtained. Dose reduction techniques were used.  CONTRAST: 62  ml Isovue 370    FINDINGS:   LOWER CHEST: Minimal bibasilar atelectasis.    HEPATOBILIARY: Cholecystectomy. No significant biliary dilatation. Liver unremarkable.    PANCREAS: Normal.    SPLEEN: Normal size.    ADRENAL GLANDS: Normal.    KIDNEYS/BLADDER: Heterogeneous area of decreased enhancement involving the anterior aspect of the right lower renal pole. Patchy areas of retained contrast material involving the cortex of the right kidney and to a lesser extent cortex of the left   kidney. Overall these findings would be compatible with bilateral pyelonephritis, greater on the right. Bilateral renal cysts, no follow-up. Symmetric contrast excretion. No intrarenal collecting system filling defect. Duplicated right intrarenal   collecting system. No bladder filling defects.    BOWEL: No obstruction or inflammatory change. Appendix unremarkable.    LYMPH NODES: No lymphadenopathy.    VASCULATURE: Unremarkable.    PELVIC ORGANS: Normal.    MUSCULOSKELETAL: H shaped vertebral bodies compatible with osseus sequela of sickle cell disease. Osseous structures otherwise unremarkable.      Impression    IMPRESSION:   1.  Changes related to bilateral pyelonephritis, greater on the right kidney as detailed above.    2.  No bowel obstruction or inflammatory change.  Appendix unremarkable.

## 2024-01-06 NOTE — H&P
[facility]    History and Physical - Hospitalist Service       Date of Admission:  1/6/2024    Assessment & Plan      Shanel Rob is a 28 year old female with history of sickle cell disease, chest syndrome, stroke related to sickle cell disease as a child, and pneumonia.  She is visiting from [place].  She presented to the [facility] ED on 1/5/2024 with fever, tachycardia, chest pain, headache, back pain, and chills.  He denied cough, dysuria, and diarrhea.  Her symptoms had started a day and a half earlier.  She was having fevers as high as 103.  She was taking ibuprofen with improvement of fevers.  This persisted so [facility].  [facility] she had testing for influenza which was negative.  She had tested negative for COVID-19 at home.  She was referred to the [facility] ED for evaluation.  Emergency department evaluation showed heart rate in the 110s, temperature 99.4, and otherwise unremarkable vital signs.  Laboratory evaluation showed creatinine 1.35, total bilirubin 1.6, ferritin 522, iron 15, undetectable troponin, lactic acid 0.9, white blood cells 33.9, hemoglobin 10, reticulocyte count 4.7, negative testing for COVID/influenza/RSV, negative monoscreen, and unremarkable venous blood gas.  Chest x-ray showed no acute process.  CT of chest showed no pulmonary embolism or acute process.  CT of abdomen and pelvis showed bilateral pyelonephritis, right greater than left.  She was started on Rocephin and admitted to [facility].  She was boarding in the emergency department due to hospital bed crisis.  She developed fever to the 103.1 with heart rate up to the 140s.  She developed hypotension with blood pressure briefly in the 70s systolic.  Code rapid response was called.  She was given IV fluid bolus and her antibiotics were broadened to cefepime and Flagyl.  Peripheral Levophed was going to be started but ultimately was not.  She remained  febrile.  She was transferred here to the ICU because there were no ICU beds available at [facility].  When I saw her she was feeling better.  She was still fatigued and with tachycardia.  Blood pressure had improved.    Problem list:    Sepsis due to acute pyelonephritis  Leukocytosis  Tachycardia  High fever  -Admit as inpatient to ICU  -Continue cefepime but not Flagyl  -Follow urine and blood cultures  -LR at 100 mL/h  -Regular diet as tolerated  -Pain medication as needed for pyelonephritis    Acute kidney injury  -Likely due to relative volume depletion  -Improving  -Continue IV fluids  -A.m. BMP    Sickle cell disease, doubt acute crisis  -Hydrate with IV fluids  -Supplemental oxygen as needed  -Pain medication as needed        Diet: Advance Diet as Tolerated: Clear Liquid Diet    DVT Prophylaxis: Enoxaparin (Lovenox) SQ  Tejeda Catheter: Not present  Lines: None     Cardiac Monitoring: ACTIVE order. Indication: ICU  Code Status: Full Code      Clinically Significant Risk Factors Present on Admission          # Hypocalcemia: Lowest Ca = 7.8 mg/dL in last 2 days, will monitor and replace as appropriate                         Disposition Plan      Expected Discharge Date: 01/08/2024                  Rio Odell MD  Hospitalist Service  [facility]  Securely message with [employer] (more info)  Text page via [facility] Paging/Directory     ______________________________________________________________________    Chief Complaint   Fever, chills, chest pain, back pain    History is obtained from the patient, Dr. Simms, and the medical record    History of Present Illness   Bridgett MCDANIEL [place] is a 28 year old female with history of sickle cell disease, chest syndrome, stroke related to sickle cell disease as a child, and pneumonia.  She is visiting from [place].  She presented to the [facility] ED on 1/5/2024 with fever, tachycardia, chest pain, headache, back pain, and chills.  He denied  cough, dysuria, and diarrhea.  Her symptoms had started a day and a half earlier.  She was having fevers as high as 103.  She was taking ibuprofen with improvement of fevers.  This persisted so [facility]  [facility] she had testing for influenza which was negative.  She had tested negative for COVID-19 at home.  She was referred to the [facility] ED for evaluation.  Emergency department evaluation showed heart rate in the 110s, temperature 99.4, and otherwise unremarkable vital signs.  Laboratory evaluation showed creatinine 1.35, total bilirubin 1.6, ferritin 522, iron 15, undetectable troponin, lactic acid 0.9, white blood cells 33.9, hemoglobin 10, reticulocyte count 4.7, negative testing for COVID/influenza/RSV, negative monoscreen, and unremarkable venous blood gas.  Chest x-ray showed no acute process.  CT of chest showed no pulmonary embolism or acute process.  CT of abdomen and pelvis showed bilateral pyelonephritis, right greater than left.  She was started on Rocephin and admitted to [facility].  She was boarding in the emergency department due to hospital bed crisis.  She developed fever to the 103.1 with heart rate up to the 140s.  She developed hypotension with blood pressure briefly in the 70s systolic.  Code rapid response was called.  She was given IV fluid bolus and her antibiotics were broadened to cefepime and Flagyl.  Peripheral Levophed was going to be started but ultimately was not.  She remained febrile.  She was transferred here to the ICU because there were no ICU beds available at [facility].  When I saw her she was feeling better.  She was still fatigued and with tachycardia.  Blood pressure had improved.      Past Medical History    Sickle cell disease  Chest syndrome  Sickle cell disease related stroke as a child  Pneumonias  Previous UTI      Prior to Admission Medications   Prior to Admission Medications   Prescriptions Last  Dose Informant Patient Reported? Taking?   Multiple Vitamins-Minerals (MULTIVITAMIN GUMMIES WOMENS) CHEW   Yes No   Sig: Take 1 Piece by mouth daily   ceFEPIme (MAXIPIME) 2 g vial   No No   Sig: Inject 2 g into the vein every 12 hours   metroNIDAZOLE (FLAGYL) 5 mg/mL infusion   No No   Sig: Inject 100 mLs (500 mg) into the vein every 12 hours      Facility-Administered Medications: None        Review of Systems    The 10 point Review of Systems is negative other than noted in the HPI or here.     Social History   I have reviewed this patient's social history and updated it with pertinent information if needed.         Family History     Sickle cell disease, diabetes, asthma, lung cancer, and breast cancer      Allergies   Allergies   Allergen Reactions    Amoxicillin Anaphylaxis     Tolerated ceftriaxone (1/6/24), cefepime (1/6/24)        Physical Exam   Vital Signs: Temp: 99  F (37.2  C) Temp src: Oral BP: 107/68 Pulse: (!) 124   Resp: 22 SpO2: 92 % O2 Device: None (Room air)    Weight: 136 lbs 3.2 oz    GENERAL: Pleasant and cooperative. No acute distress.  EYES: Pupils equal and round. No scleral erythema or icterus.  ENT: External ears are normal without deformity. Posterior oropharynx is without erythem, swelling, or exudate.  NECK: Supple. No masses or swelling. No tenderness. Thyroid is normal without mass or tenderness.  CHEST: Clear to auscultation. Normal breath sounds. No retractions.   CV: Regular rate and rhythm. No JVD. Pulses normal.  ABDOMEN: Bowel sounds present. No tenderness. No masses or hernia.  EXTREMETIES: No clubbing, cyanosis, or ischemia.  SKIN: Warm and dry to touch. No wounds or rashes.  NEUROLOGIC: Strength and sensation are normal. Deep tendon reflexes are normal. Cranial nerves are normal.      Medical Decision Making       65 MINUTES SPENT BY ME on the date of service doing chart review, history, exam, documentation & further activities per the note.      Data     I have personally  reviewed the following data over the past 24 hrs:    31.6 (H)  \   8.2 (L)   / 278     135 109 (H) 8.6 /  122 (H)   3.8 22 1.19 (H) \     ALT: 25 AST: 42 AP: 65 TBILI: 1.6 (H)   ALB: 4.0 TOT PROTEIN: 8.0 LIPASE: N/A     Trop: 10 BNP: N/A     Procal: N/A CRP: N/A Lactic Acid: 0.7       Ferritin:  522 (H) % Retic:  4.7 (H) LDH:  N/A       Imaging results reviewed over the past 24 hrs:   Recent Results (from the past 24 hour(s))   XR Chest 2 Views    Narrative    EXAM: XR CHEST 2 VIEWS  LOCATION: [facility]  DATE: 1/6/2024    INDICATION: cough  COMPARISON: None.      Impression    IMPRESSION: Heart size and pulmonary vascularity normal. The lungs are clear. Mild thoracolumbar spinal curve. Surgical clips right upper quadrant.   CT Chest Pulmonary Embolism w Contrast    Narrative    EXAM: CT CHEST PULMONARY EMBOLISM W CONTRAST  LOCATION: [facility]  DATE: 1/6/2024    INDICATION: cp, sob, sickle cell dz, wbc 33k  COMPARISON: Same day chest radiograph.  TECHNIQUE: CT chest pulmonary angiogram during arterial phase injection of IV contrast. Multiplanar reformats and MIP reconstructions were performed. Dose reduction techniques were used.   CONTRAST: 56 mL Isovue   370    FINDINGS:  ANGIOGRAM CHEST: Pulmonary arteries are normal caliber and negative for pulmonary emboli. Thoracic aorta is negative for dissection. No CT evidence of right heart strain.    LUNGS AND PLEURA: Normal.    MEDIASTINUM/AXILLAE: Normal.    CORONARY ARTERY CALCIFICATION: None.    UPPER ABDOMEN: Cholecystectomy.    MUSCULOSKELETAL: H shaped vertebral bodies compatible with osseous sequela of sickle cell disease. No other acute bony abnormality.      Impression    IMPRESSION:  1.  No pulmonary embolus or other acute process in the chest.   CT Abdomen Pelvis w Contrast    Narrative    EXAM: CT ABDOMEN PELVIS W CONTRAST  LOCATION: [facility]  DATE: 1/6/2024    INDICATION:  Abdominal pain with leukocytosis and right ureters.  COMPARISON: CT 01/06/2024  TECHNIQUE: CT scan of the abdomen and pelvis was performed following injection of IV contrast. Multiplanar reformats were obtained. Dose reduction techniques were used.  CONTRAST: 62  ml Isovue 370    FINDINGS:   LOWER CHEST: Minimal bibasilar atelectasis.    HEPATOBILIARY: Cholecystectomy. No significant biliary dilatation. Liver unremarkable.    PANCREAS: Normal.    SPLEEN: Normal size.    ADRENAL GLANDS: Normal.    KIDNEYS/BLADDER: Heterogeneous area of decreased enhancement involving the anterior aspect of the right lower renal pole. Patchy areas of retained contrast material involving the cortex of the right kidney and to a lesser extent cortex of the left   kidney. Overall these findings would be compatible with bilateral pyelonephritis, greater on the right. Bilateral renal cysts, no follow-up. Symmetric contrast excretion. No intrarenal collecting system filling defect. Duplicated right intrarenal   collecting system. No bladder filling defects.    BOWEL: No obstruction or inflammatory change. Appendix unremarkable.    LYMPH NODES: No lymphadenopathy.    VASCULATURE: Unremarkable.    PELVIC ORGANS: Normal.    MUSCULOSKELETAL: H shaped vertebral bodies compatible with osseus sequela of sickle cell disease. Osseous structures otherwise unremarkable.      Impression    IMPRESSION:   1.  Changes related to bilateral pyelonephritis, greater on the right kidney as detailed above.    2.  No bowel obstruction or inflammatory change. Appendix unremarkable.     Recent Labs   Lab 01/06/24  1516 01/06/24  1231 01/06/24  0805 01/05/24  2114   WBC  --   --  31.6* 33.9*   HGB  --   --  8.2* 10.0*   MCV  --   --  84 85   PLT  --   --  278 345   NA  --  135  --  134*   POTASSIUM  --  3.8  --  4.1   CHLORIDE  --  109*  --  98   CO2  --  22  --  23   BUN  --  8.6  --  12.8   CR  --  1.19*  --  1.35*   ANIONGAP  --  4*  --  13   TOOTIE  --  7.8*  --   8.8   * 126*  --  116*   ALBUMIN  --   --   --  4.0   PROTTOTAL  --   --   --  8.0   BILITOTAL  --   --   --  1.6*   ALKPHOS  --   --   --  65   ALT  --   --   --  25   AST  --   --   --  42

## 2024-01-06 NOTE — PHARMACY-ADMISSION MEDICATION HISTORY
Pharmacy Intern Admission Medication History    Admission medication history is complete. The information provided in this note is only as accurate as the sources available at the time of the update.    Information Source(s): Patient and CareEverywhere/SureScripts via in-person    Pertinent Information: None    Changes made to PTA medication list:  Added: multivitamin  Deleted: None  Changed: None    Medication Affordability: no        Allergies reviewed with patient and updates made in EHR: yes    Medication History Completed By: Georgia Dhillon 1/6/2024 8:51 AM    PTA Med List   Medication Sig Last Dose    Multiple Vitamins-Minerals (MULTIVITAMIN GUMMIES WOMENS) CHEW Take 1 Piece by mouth daily Past Month

## 2024-01-06 NOTE — ED TRIAGE NOTES
Pt states she was having flu like symptoms, [facility] and was [facility] for flu, had a covid test at home negative but states she has been feeling mid sternal chest pain with nausea since last night.

## 2024-01-06 NOTE — ED PROVIDER NOTES
"  History     Chief Complaint:  Chest Pain       HPI   Bridgett Ugarte is a 28 year old female with history of sickle cell anemia who presents to the ED for chest pain.  She states the pain is severe and different than prior episodes of chest pain she has had in the past.  She initially gone to urgent care to be tested for influenza as she had had fevers at home of 102.8.  She comes today with her significant other who had upper respiratory symptoms recently prompting her to think this was the cause of her illness.  Notably, she has sickle cell disease, however, she does not have frequent pain crises.  As a child she did have a stroke related to her sickle cell disease and has had several episodes of acute chest syndrome.  She states this feels different to her than any of her sickle cell episodes or prior episodes of acute chest.      Independent Historian:   Her mother via telephone confirms that she has had Rocephin in the past without a negative reaction.    Medications:    The patient is not currently taking any prescribed medications.    Past Medical History:    Sickle cell anemia     Physical Exam   Patient Vitals for the past 24 hrs:   BP Temp Temp src Pulse Resp SpO2 Height Weight   01/05/24 2102 106/61 99.4  F (37.4  C) Oral 119 18 99 % 1.702 m (5' 7\") 55.8 kg (123 lb)        Physical Exam  General: Resting on the gurney, appears uncomfortable  Head:  The scalp, face, and head appear normal  Mouth/Throat: Mucus membranes are moist  CV:  Regular rate    Normal S1 and S2  No pathological murmur   Resp:  Breath sounds clear and equal bilaterally    Non-labored, no retractions or accessory muscle use    No coarseness    No wheezing   GI:  Abdomen is soft, no rigidity    Moderate diffuse tenderness to palpation  MS:  Normal motor assessment of all extremities.    Good capillary refill noted.  Skin:  No rash or lesions noted.  Neuro:   Speech is normal and fluent. No apparent deficit.  Psych: Awake. Alert.  " Normal affect.      Appropriate interactions.      Emergency Department Course   ECG  ECG results from 01/06/24   EKG 12 lead     Value    Systolic Blood Pressure     Diastolic Blood Pressure     Ventricular Rate 111    Atrial Rate 111    MT Interval 156    QRS Duration 74        QTc 427    P Axis 63    R AXIS 102    T Axis -7    Interpretation ECG      Sinus tachycardia  Rightward axis  Nonspecific T wave abnormality  Abnormal ECG  No previous ECGs available  ECG taken at 2127 on 1/5/24     Imaging:  CT Abdomen Pelvis w Contrast   Final Result   IMPRESSION:    1.  Changes related to bilateral pyelonephritis, greater on the right kidney as detailed above.      2.  No bowel obstruction or inflammatory change. Appendix unremarkable.      CT Chest Pulmonary Embolism w Contrast   Final Result   IMPRESSION:   1.  No pulmonary embolus or other acute process in the chest.      XR Chest 2 Views   Final Result   IMPRESSION: Heart size and pulmonary vascularity normal. The lungs are clear. Mild thoracolumbar spinal curve. Surgical clips right upper quadrant.         Laboratory:  Labs Ordered and Resulted from Time of ED Arrival to Time of ED Departure   COMPREHENSIVE METABOLIC PANEL - Abnormal       Result Value    Sodium 134 (*)     Potassium 4.1      Carbon Dioxide (CO2) 23      Anion Gap 13      Urea Nitrogen 12.8      Creatinine 1.35 (*)     GFR Estimate 55 (*)     Calcium 8.8      Chloride 98      Glucose 116 (*)     Alkaline Phosphatase 65      AST 42      ALT 25      Protein Total 8.0      Albumin 4.0      Bilirubin Total 1.6 (*)    CBC WITH PLATELETS AND DIFFERENTIAL - Abnormal    WBC Count 33.9 (*)     RBC Count 3.23 (*)     Hemoglobin 10.0 (*)     Hematocrit 27.4 (*)     MCV 85      MCH 31.0      MCHC 36.5      RDW 13.6      Platelet Count 345      % Neutrophils 76      % Lymphocytes 8      % Monocytes 15      % Eosinophils 0      % Basophils 0      % Immature Granulocytes 1      NRBCs per 100 WBC 0       Absolute Neutrophils 25.8 (*)     Absolute Lymphocytes 2.8      Absolute Monocytes 5.0 (*)     Absolute Eosinophils 0.0      Absolute Basophils 0.1      Absolute Immature Granulocytes 0.4      Absolute NRBCs 0.0     TROPONIN T, HIGH SENSITIVITY - Normal    Troponin T, High Sensitivity <6     INFLUENZA A/B, RSV, & SARS-COV2 PCR - Normal    Influenza A PCR Negative      Influenza B PCR Negative      RSV PCR Negative      SARS CoV2 PCR Negative     HCG QUANTITATIVE PREGNANCY - Normal    hCG Quantitative <1     TYPE AND SCREEN, ADULT    SPECIMEN EXPIRATION DATE [id number]     BLOOD CULTURE   BLOOD CULTURE   ABO/RH TYPE AND SCREEN        Emergency Department Course & Assessments:   Interventions:  Medications   azithromycin (ZITHROMAX) 500 mg vial to attach to  mL bag (has no administration in time range)   cefTRIAXone (ROCEPHIN) 2 g vial to attach to  ml bag for ADULTS or NS 50 ml bag for PEDS (has no administration in time range)   ibuprofen (ADVIL/MOTRIN) tablet 600 mg (600 mg Oral $Given 1/5/24 2126)   sodium chloride 0.9% BOLUS 1,000 mL (1,000 mLs Intravenous $New Bag 1/5/24 2122)   sodium chloride 0.9% BOLUS 1,000 mL (1,000 mLs Intravenous $New Bag 1/6/24 0117)        Independent Interpretation (X-rays, CTs, rhythm strip):  No ptx, no dense consolidaitons, no free air    Consultations/Discussion of Management or Tests:  Case discussed with Dr. Parmar, hospitalist for admission.     Social Determinants of Health affecting care:   From out of town  Has received care through multiple out of state health care systems due ot moving frequently    Disposition:  The patient was admitted to the hospital under the care of Dr. Parmar.     Impression & Plan      Medical Decision Making:  Bridgett Ugarte is a 28 year old female with sickle cell disease who presents initially complaining of chest pain.  She later mentions that she had had a fever which is part of what made her concerned enough to go to urgent  care.  Her evaluation is notable for significant leukocytosis with a white blood cell count of 33k.  She has also been persistently tachycardic and feeling short of breath and lightheaded.  She has not had a cough.  Given her symptoms and sickle cell disease my first concern was for acute chest syndrome.  Additionally she has had recent travel putting her at higher risk for PE.  CT PE study was obtained which was thankfully unremarkable.  Broad-spectrum antibiotics were ordered initially covering for pulmonary causes.  She later developed abdominal pain and complained to the hospitalist of back pain.  At this time she started having higher fevers, more pronounced tachycardia, and rigors.  CT of the abdomen and pelvis was obtained which was most consistent with pyelonephritis.  Though she had no urinary symptoms a UA was obtained and was consistent with infection.  She does have anaphylaxis to amoxicillin but after discussion with her mother she has tolerated Rocephin in the past and thus this was started for broad coverage.  She was given aggressive fluid resuscitation and supplemental oxygen in the emergency department.  She will be admitted to [facility] for management of her sepsis with likely bacteremia as well as concern for chest pain in the setting of sickle cell disease with infection.  She is admitted in critical but stabilized condition.    Critical care time 100 minutes excluding procedures.      Diagnosis:    ICD-10-CM    1. Sepsis, due to unspecified organism, unspecified whether acute organ dysfunction present (H)  A41.9       2. Sickle cell disease with crisis (H)  D57.00       3. Leukocytosis, unspecified type  D72.829              Scribe Disclosure:  Bre BERNAL, am serving as a scribe at 1:23 AM on 1/6/2024 to document services personally performed by Shannan Perdomo MD based on my observations and the provider's statements to me.     1/6/2024   Shannan Perdomo MD Taxman, Karah M, MD  01/06/24  0891

## 2024-01-06 NOTE — H&P
[facility]    History and Physical - Hospitalist Service       Date of Admission:  1/6/2024    Assessment & Plan      Bridgett Ugarte is a 28 year old female admitted on 1/6/2024. She presents to the emergency department with rigors and myalgias as well as fevers since Thursday.  Found to be tachycardic with abdominal pain.  CT imaging demonstrated bilateral pyelonephritis    Bilateral pyelonephritis with associated severe sepsis and suspected gram-negative bacteremia: Patient with bilateral flank pain on percussion, rigors and fever since Thursday.  Grossly abnormal urinalysis, abdominal pain on exam, and CT imaging with findings concerning for bilateral pyelonephritis.  Leukocytosis of 33,900, tachycardia into the 140s range, fever of 103.1, rigors, LORENZO,  -IV ceftriaxone 2 g every 24 hours  -Received an initial dose of azithromycin in the emergency department (with initial concern for acute chest syndrome),  but this will be discontinued now with finding of bilateral pyelonephritis, no infiltrate on CT PE study.  -Urine culture pending  -Blood cultures x 2 pending  -Monospot ordered prior to CT returning positive for bilateral pyelonephritis.  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  Does not feel that she is in a sickle crisis despite acute illness.  Certainly at risk.  History of stroke at age 7 during the suspected viral illness and sickle crisis.  History of acute chest requiring blood transfusions when she was still a minor being followed by pediatrics.  Last pain crisis approximately 2017.  Tells me that she frequently develops acute chest with pain crises in the past  -Iron, ferritin, iron binding capacity, reticulocyte count pending  -Bilirubin slightly up  -Trend CBC; hemoglobin of 10 reassuring,  would not want greater than 11 given risk of hyperviscosity.  -Oximetry, nasal cannula oxygen to maintain oxygen saturations greater than 97% and reduce sickling if present.  With history of acute chest during illnesses, page provider if worsening oxygen needs.  No infiltrate on initial CT, but does not preclude future acute chest with her current sepsis.  -Patient has been consented for blood products by myself in the emergency department in case of sickle cell crisis and need for transfusion or exchange transfusions.  -Oxycodone, acetaminophen, ibuprofen, and IV Dilaudid available as needed for pain control    Acute kidney injury: Suspected.  Creatinine 1.35.  -IV volume resuscitation, can trend BMP tomorrow            Diet:  Regular diet as tolerated  DVT Prophylaxis: Enoxaparin (Lovenox) SQ  Tejeda Catheter: Not present  Lines: None     Cardiac Monitoring: None  Code Status:  Full code.  Confirmed with patient on admission    Clinically Significant Risk Factors Present on Admission                                  Disposition Plan      Expected Discharge Date: 01/08/2024                  Yayo Parmar MD  Hospitalist Service  [facility]  Securely message with [employer] (more info)  Text page via [employer] Paging/Directory     ______________________________________________________________________    Chief Complaint   Fevers and shaking chills, myalgias, nausea    History is obtained from the patient, chart review, discussion with Dr. Perdomo in the emergency department, brief review of limited outside records describing her prior history of stroke and acute chest prior to 2017.    History of Present Illness   Bridgett Ugarte is a 28 year old female who presents to the emergency department for evaluation of fevers and shaking chills.    Patient lives in [place], has been here since mid December visiting her significant other.  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  She has no residual deficits from her stroke.  She has very infrequent pain crises.  Her last pain episode she believes took place in 2017.  Last acute chest was approximately 10 years ago when she was still being followed by pediatrics clinic.  She is not on hydroxyurea.  Tells me that she recalls reading the side effects, and feeling it was not worth it as she has infrequent pain episodes.    Patient's significant other has had approximately 3 to 5 days of some nasal sinus congestion.  He thought this was a sinus infection and received a prescription for azithromycin.  Did not have fevers with his illness.    Patient does not note any significant sinus congestion, no cough, no shortness of breath.  On Thursday night, however, she noted that her back felt stiff.  Later she was experiencing shaking chills and myalgias throughout her back.  Spiked fevers in the 103 range.  Symptoms continued on Friday which finally led her to present to the emergency department for evaluation.  She says that she has some chest tightness but it is not consistent with her prior sickle cell pain.  She aches all over, but her discomfort is really in the epigastrium substernal and reproducible on palpation.  This is a different pain than her chest tightness.  She also has bilateral flank pain with percussion, right lateral abdominal discomfort with some local peritonitis.  Left lower quadrant discomfort.    As she described chest tightness initially in the emergency department and was tachycardic into the 120s range, a CT PE study was performed given her history of acute chest.  This was thankfully negative for infiltrates.  She was treated with ceftriaxone and azithromycin prior to this to cover for sepsis and acute chest given her history of sickle cell disease.    On my exam, patient with some urge to urinate on palpation as well as  bilateral flank pain, more pronounced abdominal pain.  She is also exhibiting acute rigors and fever of 103.  No significant cervical chain lymphadenopathy.  A CT of the abdomen pelvis was requested demonstrating bilateral pyelonephritis, likely the cause of her acute severe sepsis.        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  Alert and conversant.  During interview, however, she develops rigors and appears less well.  Compensating well likely because of age and general health.  Eyes: Mild scleral injection bilaterally, no scleral icterus.  HEENT: Normocephalic and atraumatic.  No significant cervical chain lymphadenopathy.  No posterior oropharyngeal exudate or erythema.  Slightly generous tonsils with no exudate.  Respiratory: Breath sounds are clear bilaterally to auscultation without wheezes or crackles.  Good effort  Cardiovascular: Tachycardia to the 130s range.  No appreciable murmur regular rhythm  GI: Patient with a right mid lateral abdomen discomfort to palpation and localized peritonitis at the site.  Lesser tenderness to palpation in the left lower quadrant and sensation of urinary urgency with suprapubic palpation.  Lymph/Hematologic: No lower extremity edema  Genitourinary: Sensation of urinary urgency with suprapubic palpation and bilateral flank pain to percussion.  Skin: No appreciated rashes, no jaundice, no ulcers  Musculoskeletal: Thin.  Muscular tone and bulk intact in all extremities appropriate for age.  Neurologic: Alert, conversant, appropriate conversation.  Mental status grossly  intact.  Psychiatric: Very pleasant, normal affect    Medical Decision Making       75 MINUTES SPENT BY ME on the date of service doing chart review, history, exam, documentation & further activities per the note.      Data     I have personally reviewed the following data over the past 24 hrs:    33.9 (H)  \   10.0 (L)   / 345     134 (L) 98 12.8 /  116 (H)   4.1 23 1.35 (H) \     ALT: 25 AST: 42 AP: 65 TBILI: 1.6 (H)   ALB: 4.0 TOT PROTEIN: 8.0 LIPASE: N/A     Trop: <6 BNP: N/A     Procal: N/A CRP: N/A Lactic Acid: 0.9         Imaging results reviewed over the past 24 hrs:   Recent Results (from the past 24 hour(s))   XR Chest 2 Views    Narrative    EXAM: XR CHEST 2 VIEWS  LOCATION: [facility]  DATE: 1/6/2024    INDICATION: cough  COMPARISON: None.      Impression    IMPRESSION: Heart size and pulmonary vascularity normal. The lungs are clear. Mild thoracolumbar spinal curve. Surgical clips right upper quadrant.   CT Chest Pulmonary Embolism w Contrast    Narrative    EXAM: CT CHEST PULMONARY EMBOLISM W CONTRAST  LOCATION: [facility]  DATE: 1/6/2024    INDICATION: cp, sob, sickle cell dz, wbc 33k  COMPARISON: Same day chest radiograph.  TECHNIQUE: CT chest pulmonary angiogram during arterial phase injection of IV contrast. Multiplanar reformats and MIP reconstructions were performed. Dose reduction techniques were used.   CONTRAST: 56 mL Isovue   370    FINDINGS:  ANGIOGRAM CHEST: Pulmonary arteries are normal caliber and negative for pulmonary emboli. Thoracic aorta is negative for dissection. No CT evidence of right heart strain.    LUNGS AND PLEURA: Normal.    MEDIASTINUM/AXILLAE: Normal.    CORONARY ARTERY CALCIFICATION: None.    UPPER ABDOMEN: Cholecystectomy.    MUSCULOSKELETAL: H shaped vertebral bodies compatible with osseous sequela of sickle cell disease. No other acute bony abnormality.      Impression    IMPRESSION:  1.  No pulmonary embolus or other  acute process in the chest.   CT Abdomen Pelvis w Contrast    Narrative    EXAM: CT ABDOMEN PELVIS W CONTRAST  LOCATION: [facility]  DATE: 1/6/2024    INDICATION: Abdominal pain with leukocytosis and right ureters.  COMPARISON: CT 01/06/2024  TECHNIQUE: CT scan of the abdomen and pelvis was performed following injection of IV contrast. Multiplanar reformats were obtained. Dose reduction techniques were used.  CONTRAST: 62  ml Isovue 370    FINDINGS:   LOWER CHEST: Minimal bibasilar atelectasis.    HEPATOBILIARY: Cholecystectomy. No significant biliary dilatation. Liver unremarkable.    PANCREAS: Normal.    SPLEEN: Normal size.    ADRENAL GLANDS: Normal.    KIDNEYS/BLADDER: Heterogeneous area of decreased enhancement involving the anterior aspect of the right lower renal pole. Patchy areas of retained contrast material involving the cortex of the right kidney and to a lesser extent cortex of the left   kidney. Overall these findings would be compatible with bilateral pyelonephritis, greater on the right. Bilateral renal cysts, no follow-up. Symmetric contrast excretion. No intrarenal collecting system filling defect. Duplicated right intrarenal   collecting system. No bladder filling defects.    BOWEL: No obstruction or inflammatory change. Appendix unremarkable.    LYMPH NODES: No lymphadenopathy.    VASCULATURE: Unremarkable.    PELVIC ORGANS: Normal.    MUSCULOSKELETAL: H shaped vertebral bodies compatible with osseus sequela of sickle cell disease. Osseous structures otherwise unremarkable.      Impression    IMPRESSION:   1.  Changes related to bilateral pyelonephritis, greater on the right kidney as detailed above.    2.  No bowel obstruction or inflammatory change. Appendix unremarkable.

## 2024-01-06 NOTE — PROGRESS NOTES
[facility]    Medicine Progress Note - Hospitalist Service        Date of Admission:  1/6/2024 12:22 AM    Non-billing note:    Patient seen and examined.  Discharge physical reviewed and also discussed with Dr. Parmar who admitted the patient earlier this morning.  28-year-old female with history of sickle cell disease presented with sepsis due to acute pyelonephritis.  Patient has received about 3 and half liters of fluid and started becoming hypotensive again.  Continues to be febrile.  Also concern for possible sickle cell crisis.  She has had sufficient fluid challenge and is still hypotensive, likely will need pressors.  Discussed with , intensivist patient will now be transferred to the ICU.  Also updated ER physician Dr. Vincent.  For now we will attempt to start Levophed through peripheral line however if she continues to decline then will likely need emergent central venous catheter.  Also placed consult for hematology.  As patient will be going to ICU she will now be managed by intensivist. If she comes off pressors then I will resume care tomorrow morning.

## 2024-01-06 NOTE — CODE/RAPID RESPONSE
[facility]   RRT/House Officer KANDY Progress Note  Date of Service: 1/6/2024   Time Called: 1018  RRT called for (per RN): low BP     IMPRESSION & PLAN:  Assessment & Plan     Bridgett Ugarte is a 28 year old female w/ PMH of sickle cell anemia , who was admitted on 1/6/2024 for acute infectious symptoms, found to have bilateral pyelonephritis    RRT called by RN due to low blood pressures.  Patient received 3.3 L of fluid prior to my arrival and was receiving an additional 256 bolus.  Pressures of 77/50 nahed were noted.  Additional 250 given.  Heart rates remained stable in the 120s.  Room air desaturating to 88%.  Started O2 at 4 L and now > 90. Pt w multiple pain areas, chest pressure (better here) Has bilateral pyelonephritis by the symptoms and imaging.  Had received azithromycin/Rocephin overnight.  W sepsis signs, low BP, Will widen antibiotics.  Initial thought Zosyn but deferred due to PCN allergy (tolerated ceftriaxone). Then meropenum, but ultimately changed to cefepime/Flagyl w ID consulted.  Continue IVF with LR at 125 an hour, no additional boluses. No levophed need at present.  Dilaudid IV x 2 given for diffuse pain.  Improved.  EKG reviewed no acute ischemia (ST depression seen earlier are resolved). Low trop on admit, recheck.  New IV right hand.  Left IV is functional.    Working diagnosis/Impression:  Bilateral pyelonephritis with hypotension/sepsis  Sickle cell anemia with acute pain syndrome/poss chest syndrome.    Differential diagnosis considered following (not exclusive):  ACS.    INTERVENTIONS:  -IVF-finish 250 bolus, then continue  an hour x 1 day  -No need for Levophed at this time but may need  --ABX-stop Rocephin/azithromycin-->-Start cefepime/Flagyl (d/w pharm)  -ID consulted for further ABX management   -blood cultures in process  -Urine cultures in process  -Stat lactate-WNL 1.1  -VBG okay 7.39, CO2 30.   - O2 via nasal cannula prn Spo2 <92%  -Dilaudid 0.2 IV  x 2 as needed diffuse pain  -has prn oxycodone already  - ECG w/ subtle II ST depression, improved vs 1/5 (has lat ST depre v3/4 then too) and no new acute ischemia  -Curbside discussed with intensivist who agrees with plan to date.  -If pressures worsen consider Levophed though does have the risk of worsening sickle cell also but BP control would take precedence  - detailed discussion w hospitalist    At the end of the RRT, pt appears to be improved VS with systolics 105, IV flowing, patient reporting to the RRT flyer after the visit that the Dilaudid IV as needed did help her overall pain symptoms.    Disposition- remains in room w ED w close monitoring-ICU considered but no bed available    Discussed with Dr Blanc in detail.  He was not initially aware that an RRT had been separately called.  We discussed antibiotics and agreed with widening.  We thought of meropenem, noting she tolerated ceftriaxone.  She does have a penicillin allergy.  However on further discussion with pharmacist I will instead switch to cefepime/Flagyl.  Plus we have ID consulted.  He is looking for a possible ICU bed for monitoring but none are available.  He will continue to monitor closely.    Defer future cares to Dr Jayashree west/hospitalist attending provider     Code Status: No Order    Allergies   Allergies   Allergen Reactions    Amoxicillin Anaphylaxis     Last 24H PRN:     acetaminophen (TYLENOL) tablet 650 mg, 650 mg at 01/06/24 0947 **OR** acetaminophen (TYLENOL) Suppository 650 mg    prochlorperazine (COMPAZINE) injection 10 mg **OR** prochlorperazine (COMPAZINE) tablet 10 mg, 10 mg at 01/06/24 0908 **OR** prochlorperazine (COMPAZINE) suppository 25 mg    Subjective:  Interval History     Bridgett Ugarte is a 28 year old female who was admitted on 1/6/2024 for acute pyelonephritis with sickle cell crisis.     -Since RRT has been called she does not report any new symptoms.   She has not had any shortness of breath per  her report.  She has had some mild chest pressure but that has been present since arrival and has not changed.  No other CV symptoms offered.  Denies any worsening or significant back pain or abdominal pain.  Though she had a fever that was not mentioned. Patient reports the oxycodone that was given earlier today has been somewhat helpful for her generalized pain but not fully.  She also details diffuse pain in her neck or back her chest is noted as well as her upper forehead and eyebrow area.  After she was given some Dilaudid during the RRT she reports that she did feel better and reports that the flare RN to me afterwards.      Patient is here traveling and visiting from [place].  She was about to fly home when she had onset of symptoms.  She had taken Motrin at home for fevers per her report.    Exam::  Physical Exam   Vital Signs with Ranges:  Vitals:    01/06/24 1019 01/06/24 1034 01/06/24 1059 01/06/24 1115   BP: 107/53 (!) 109/96 93/51 96/55   Pulse: (!) 130 (!) 131 (!) 130 (!) 130   Resp: 26 11 15 14   Temp:       TempSrc:       SpO2: 92% 100% 99% 98%   Weight:       Height:         Temp:  [99.4  F (37.4  C)-103.1  F (39.5  C)] 101.2  F (38.4  C)  Pulse:  [105-146] 130  Resp:  [11-30] 14  BP: ()/(51-96) 96/55  SpO2:  [90 %-100 %] 98 %  No intake/output data recorded.    Lines, PIV , left AC.  Hand with minimal swelling over the palmar aspect of digits 2 3 PIP.  Otherwise I do not see much for significant hand swelling.  IV is not infiltrated in the left AC.  Starting her right upper extremity IV shortly.  Constitutional: vs as above and/or per EMR  General:  adult pt lying in bed without acute distress  HEENT: NC/AT,      Neck: w/o JVD, supple  CV: S1S2, w/o obvious murmur  Resp: on O2 via nasal cannula, 90%, (reported 88% on room air), Spo2 , chest rise unlabored, lungs clr  , w/o rhonchi, rales, whz upper and lower lobes  GI not palpated  Musculoskeletal: MAEW, no lower edema or mottling  Skin: no  obvious rashes on exposed skin  Lymph: defer  Neuro: non focal, faces symmetric, tongue midline, speech fluent  Psych: Calm, interactive, pleasant, talking with her mother via speaker phone who joined us for the visit.    Labs/Imaging  Data     CBC with Diff:  Recent Labs   Lab Test 01/06/24  0805 01/05/24 2114   WBC 31.6* 33.9*   HGB 8.2* 10.0*   MCV 84 85    345      Comprehensive Metabolic Panel:  Recent Labs   Lab 01/05/24 2114   *   POTASSIUM 4.1   CHLORIDE 98   CO2 23   ANIONGAP 13   *   BUN 12.8   CR 1.35*   GFRESTIMATED 55*   TOOITE 8.8   PROTTOTAL 8.0   ALBUMIN 4.0   BILITOTAL 1.6*   ALKPHOS 65   AST 42   ALT 25     VBG/ABG:    Recent Labs   Lab 01/06/24  1104 01/06/24  0124   PHV 7.39 7.37   PCO2V 30* 38*   PO2V 44 33   HCO3V 18* 22      No lab results found in last 7 days.    Lactic Acid:    Lab Results   Component Value Date    LACT 0.7 01/06/2024    LACT 0.9 01/06/2024       UA:  Recent Labs   Lab 01/06/24  0328   COLOR Straw   APPEARANCE Slightly Cloudy*   URINEGLC Negative   URINEBILI Negative   URINEKETONE 10*   SG 1.021   UBLD Small*   URINEPH 5.5   PROTEIN 30*   NITRITE Negative   LEUKEST Large*   RBCU 6*   WBCU >182*       IMAGING recent:   I reviewed radiology interpretation below    CT Abdomen Pelvis w Contrast  Result Date: 1/6/2024  FINDINGS: LOWER CHEST: Minimal bibasilar atelectasis. HEPATOBILIARY: Cholecystectomy. No significant biliary dilatation. Liver unremarkable. PANCREAS: Normal. SPLEEN: Normal size. ADRENAL GLANDS: Normal. KIDNEYS/BLADDER: Heterogeneous area of decreased enhancement involving the anterior aspect of the right lower renal pole. Patchy areas of retained contrast material involving the cortex of the right kidney and to a lesser extent cortex of the left kidney. Overall these findings would be compatible with bilateral pyelonephritis, greater on the right. Bilateral renal cysts, no follow-up. Symmetric contrast excretion. No intrarenal collecting system  filling defect. Duplicated right intrarenal collecting system. No bladder filling defects. BOWEL: No obstruction or inflammatory change. Appendix unremarkable. LYMPH NODES: No lymphadenopathy. VASCULATURE: Unremarkable. PELVIC ORGANS: Normal. MUSCULOSKELETAL: H shaped vertebral bodies compatible with osseus sequela of sickle cell disease. Osseous structures otherwise unremarkable.   IMPRESSION: 1.  Changes related to bilateral pyelonephritis, greater on the right kidney as detailed above. 2.  No bowel obstruction or inflammatory change. Appendix unremarkable.    CT Chest Pulmonary Embolism w Contrast  Result Date: 1/6/2024  CHEST: Pulmonary arteries are normal caliber and negative for pulmonary emboli. Thoracic aorta is negative for dissection. No CT evidence of right heart strain. LUNGS AND PLEURA: Normal. MEDIASTINUM/AXILLAE: Normal. CORONARY ARTERY CALCIFICATION: None. UPPER ABDOMEN: Cholecystectomy. MUSCULOSKELETAL: H shaped vertebral bodies compatible with osseous sequela of sickle cell disease. No other acute bony abnormality.   IMPRESSION: 1.  No pulmonary embolus or other acute process in the chest.    XR Chest 2 Views  Result Date: 1/6/2024  IMPRESSION: Heart size and pulmonary vascularity normal. The lungs are clear. Mild thoracolumbar spinal curve. Surgical clips right upper quadrant.      Time Spent on this Encounter   I spent 45 minutes of critical care time on the unit/floor managing the care of this patient. Upon evaluation, this patient had a high probability of imminent or life-threatening deterioration due to pyelonephritis with hypotension/sepsis, which required my direct attention, intervention, and personal management including  100% of my time was spent at the bedside counseling the patient and extensive provider coordinating care regarding services listed in this note.     [facility] House Officer/KANDY  Jeremias Gomez PA-C    [facility]  Securely  message with the [employer] (learn more here)  Text page via [employer] Paging/Directory

## 2024-01-06 NOTE — ED NOTES
[facility]  ED Nurse Handoff Report    ED Chief complaint: Chest Pain      ED Diagnosis:   Final diagnoses:   Sepsis, due to unspecified organism, unspecified whether acute organ dysfunction present (H)   Sickle cell disease with crisis (H)   Leukocytosis, unspecified type       Code Status: Full Code    Allergies:   Allergies   Allergen Reactions    Amoxicillin Anaphylaxis       Patient Story: Pt is a 28 year old female admitted on 1/6/2024. She presents to the emergency department with rigors and myalgias as well as fevers since Thursday.  Found to be tachycardic with abdominal pain.  CT imaging demonstrated bilateral pyelonephritis.       Focused Assessment:  Pt is awake, alert and orientated X 4.    Treatments and/or interventions provided: Labs, CT, Xray,     Patient's response to treatments and/or interventions: Pt tolerated well     To be done/followed up on inpatient unit:      Does this patient have any cognitive concerns?:  A O X 4    Activity level - Baseline/Home:  Independent  Activity Level - Current:   Independent    Patient's Preferred language: English   Needed?: No    Isolation: None  Infection: Not Applicable  Patient tested for COVID 19 prior to admission: NO  Bariatric?: No    Vital Signs:   Vitals:    01/06/24 0529 01/06/24 0559 01/06/24 0629 01/06/24 0659   BP:       Pulse: (!) 140 (!) 135 (!) 126 116   Resp: 15 18 21 21   Temp:       TempSrc:       SpO2: 97% 98% 97% 97%   Weight:       Height:           Cardiac Rhythm:     Was the PSS-3 completed:   Yes  What interventions are required if any?               Family Comments: Significant other left for the evening   OBS brochure/video discussed/provided to patient/family: No              Name of person given brochure if not patient:               Relationship to patient: significant other     For the majority of the shift this patient's behavior was Green.   Behavioral interventions performed were .    ED NURSE PHONE  NUMBER: *[phone]

## 2024-01-06 NOTE — LETTER
[facility] PEDIATRIC  [address] [facility] [address]  Phone: [phone]  Fax: [phone]    January 10, 2024        Bridgett Ugarte  [address]  [facility] [address]          To whom it may concern:    RE: Bridgett Ugarte    Patient was in the hospital from 1/6/2024 to 1/10/2024 due to acute medical issues.     Please contact me for questions or concerns.      Sincerely,      Nirali Ray MD  [facility] Medicine

## 2024-01-06 NOTE — LETTER
[facility] PEDIATRIC  [address] [facility] [address]  Phone: [phone]  Fax: [phone]    January 10, 2024        Bridgett MCDANIEL Shanel  [address]  [facility] [address]          To whom it may concern:    RE: Bridgett VIOLETA Ugarte    Patient was presented to Emergency room on 01/05/2024 and subsequently admitted to the hospital. She was discharged from the hospital on 1/10/2024.     Please contact me for questions or concerns.      Sincerely,      Nirali Ray MD  [facility] Medicine

## 2024-01-06 NOTE — ED NOTES
IV cefipime paused at this time as pt feels burning in left eye and tingling sensation in chest. Monitor.

## 2024-01-07 LAB
ANION GAP SERPL CALCULATED.3IONS-SCNC: 8 MMOL/L (ref 7–15)
BACTERIA UR CULT: ABNORMAL
BUN SERPL-MCNC: 5.7 MG/DL (ref 6–20)
CALCIUM SERPL-MCNC: 7.9 MG/DL (ref 8.6–10)
CHLORIDE SERPL-SCNC: 105 MMOL/L (ref 98–107)
CREAT SERPL-MCNC: 1 MG/DL (ref 0.51–0.95)
DEPRECATED HCO3 PLAS-SCNC: 21 MMOL/L (ref 22–29)
EGFRCR SERPLBLD CKD-EPI 2021: 79 ML/MIN/1.73M2
ERYTHROCYTE [DISTWIDTH] IN BLOOD BY AUTOMATED COUNT: 14.8 % (ref 10–15)
GLUCOSE BLDC GLUCOMTR-MCNC: 111 MG/DL (ref 70–99)
GLUCOSE BLDC GLUCOMTR-MCNC: 114 MG/DL (ref 70–99)
GLUCOSE BLDC GLUCOMTR-MCNC: 124 MG/DL (ref 70–99)
GLUCOSE BLDC GLUCOMTR-MCNC: 84 MG/DL (ref 70–99)
GLUCOSE SERPL-MCNC: 142 MG/DL (ref 70–99)
HCT VFR BLD AUTO: 20.5 % (ref 35–47)
HGB BLD-MCNC: 7.6 G/DL (ref 11.7–15.7)
MCH RBC QN AUTO: 30.6 PG (ref 26.5–33)
MCHC RBC AUTO-ENTMCNC: 37.1 G/DL (ref 31.5–36.5)
MCV RBC AUTO: 83 FL (ref 78–100)
PLATELET # BLD AUTO: 265 10E3/UL (ref 150–450)
POTASSIUM SERPL-SCNC: 3.4 MMOL/L (ref 3.4–5.3)
RBC # BLD AUTO: 2.48 10E6/UL (ref 3.8–5.2)
SODIUM SERPL-SCNC: 134 MMOL/L (ref 135–145)
WBC # BLD AUTO: 26.4 10E3/UL (ref 4–11)

## 2024-01-07 PROCEDURE — 200N000001 HC R&B ICU

## 2024-01-07 PROCEDURE — 36415 COLL VENOUS BLD VENIPUNCTURE: CPT | Performed by: INTERNAL MEDICINE

## 2024-01-07 PROCEDURE — 250N000013 HC RX MED GY IP 250 OP 250 PS 637: Performed by: INTERNAL MEDICINE

## 2024-01-07 PROCEDURE — 80048 BASIC METABOLIC PNL TOTAL CA: CPT | Performed by: INTERNAL MEDICINE

## 2024-01-07 PROCEDURE — 85027 COMPLETE CBC AUTOMATED: CPT | Performed by: INTERNAL MEDICINE

## 2024-01-07 PROCEDURE — 99232 SBSQ HOSP IP/OBS MODERATE 35: CPT | Performed by: INTERNAL MEDICINE

## 2024-01-07 PROCEDURE — 250N000011 HC RX IP 250 OP 636: Performed by: INTERNAL MEDICINE

## 2024-01-07 RX ORDER — FLUCONAZOLE 150 MG/1
150 TABLET ORAL ONCE
Status: COMPLETED | OUTPATIENT
Start: 2024-01-07 | End: 2024-01-07

## 2024-01-07 RX ORDER — LEVOFLOXACIN 5 MG/ML
500 INJECTION, SOLUTION INTRAVENOUS EVERY 24 HOURS
Status: DISCONTINUED | OUTPATIENT
Start: 2024-01-07 | End: 2024-01-09

## 2024-01-07 RX ORDER — LACTOBACILLUS RHAMNOSUS GG 10B CELL
1 CAPSULE ORAL DAILY
Status: DISCONTINUED | OUTPATIENT
Start: 2024-01-07 | End: 2024-01-10 | Stop reason: HOSPADM

## 2024-01-07 RX ADMIN — ACETAMINOPHEN 650 MG: 325 TABLET, FILM COATED ORAL at 07:23

## 2024-01-07 RX ADMIN — OXYCODONE HYDROCHLORIDE 5 MG: 5 TABLET ORAL at 14:31

## 2024-01-07 RX ADMIN — HYDROMORPHONE HYDROCHLORIDE 0.3 MG: 1 INJECTION, SOLUTION INTRAMUSCULAR; INTRAVENOUS; SUBCUTANEOUS at 12:10

## 2024-01-07 RX ADMIN — FLUCONAZOLE 150 MG: 150 TABLET ORAL at 16:39

## 2024-01-07 RX ADMIN — ACETAMINOPHEN 650 MG: 325 TABLET, FILM COATED ORAL at 02:50

## 2024-01-07 RX ADMIN — LEVOFLOXACIN 500 MG: 500 INJECTION, SOLUTION INTRAVENOUS at 14:23

## 2024-01-07 RX ADMIN — ACETAMINOPHEN 650 MG: 325 TABLET, FILM COATED ORAL at 17:35

## 2024-01-07 RX ADMIN — ONDANSETRON 4 MG: 2 INJECTION INTRAMUSCULAR; INTRAVENOUS at 18:47

## 2024-01-07 RX ADMIN — HYDROMORPHONE HYDROCHLORIDE 0.3 MG: 1 INJECTION, SOLUTION INTRAMUSCULAR; INTRAVENOUS; SUBCUTANEOUS at 02:58

## 2024-01-07 RX ADMIN — OXYCODONE HYDROCHLORIDE 2.5 MG: 5 TABLET ORAL at 00:19

## 2024-01-07 RX ADMIN — ACETAMINOPHEN 650 MG: 325 TABLET, FILM COATED ORAL at 22:45

## 2024-01-07 RX ADMIN — Medication 1 CAPSULE: at 16:39

## 2024-01-07 RX ADMIN — CEFEPIME 2 G: 2 INJECTION, POWDER, FOR SOLUTION INTRAVENOUS at 00:19

## 2024-01-07 RX ADMIN — HYDROMORPHONE HYDROCHLORIDE 0.3 MG: 1 INJECTION, SOLUTION INTRAMUSCULAR; INTRAVENOUS; SUBCUTANEOUS at 19:40

## 2024-01-07 RX ADMIN — OXYCODONE HYDROCHLORIDE 2.5 MG: 5 TABLET ORAL at 19:40

## 2024-01-07 RX ADMIN — ONDANSETRON 4 MG: 2 INJECTION INTRAMUSCULAR; INTRAVENOUS at 03:43

## 2024-01-07 RX ADMIN — CEFEPIME 2 G: 2 INJECTION, POWDER, FOR SOLUTION INTRAVENOUS at 12:13

## 2024-01-07 ASSESSMENT — ACTIVITIES OF DAILY LIVING (ADL)
ADLS_ACUITY_SCORE: 35

## 2024-01-07 NOTE — PROGRESS NOTES
[facility]    Medicine Progress Note - Hospitalist Service    Date of Admission:  1/6/2024    Assessment & Plan     Shanel Rob is a 28 year old female with history of sickle cell disease, chest syndrome, stroke related to sickle cell disease as a child, and pneumonia.  She is visiting from [place].  She presented to the [facility] ED on 1/5/2024 with fever, tachycardia, chest pain, headache, back pain, and chills.  He denied cough, dysuria, and diarrhea.  Her symptoms had started a day and a half earlier.  She was having fevers as high as 103.  She was taking ibuprofen with improvement of fevers.  This persisted so she went to a minute clinic.  At the minute clinic she had testing for influenza which was negative.  She had tested negative for COVID-19 at home.  She was referred to the [facility] ED for evaluation.  Emergency department evaluation showed heart rate in the 110s, temperature 99.4, and otherwise unremarkable vital signs.  Laboratory evaluation showed creatinine 1.35, total bilirubin 1.6, ferritin 522, iron 15, undetectable troponin, lactic acid 0.9, white blood cells 33.9, hemoglobin 10, reticulocyte count 4.7, negative testing for COVID/influenza/RSV, negative monoscreen, and unremarkable venous blood gas.  Chest x-ray showed no acute process.  CT of chest showed no pulmonary embolism or acute process. CT of abdomen and pelvis showed bilateral pyelonephritis, right greater than left.  She was started on Rocephin and admitted to [facility].  She was boarding in the emergency department due to hospital bed crisis. She developed fever to the 103.1 with heart rate up to the 140s.  She developed hypotension with blood pressure briefly in the 70s systolic.  Code rapid response was called.  She was given IV fluid bolus and her antibiotics were broadened to cefepime and Flagyl.  Peripheral Levophed was going to be started but ultimately was not.  She remained  febrile.  She was transferred here to the ICU because there were no ICU beds available at [facility].  When I saw her she was feeling better.  She was still fatigued and with tachycardia.  Blood pressure had improved. After admission urine culture E. Coli.      Problem list:     Sepsis due to acute pyelonephritis with E. coli  Leukocytosis  Tachycardia  High fever  -Improving. Urine culture is growing E. Coli.   -Had some itching and chest tightness without rash after cefepime. She has a PCN allergy. Change PCN to Levaquin pending culture susceptibilities. Likely to oral antibiotic and possibly home tomorrow.   -Follow blood cultures  -Stop LR.  -Regular diet as tolerated  -Pain medication as needed for pyelonephritis  -Ok to transfer out of ICU to med/surg telemetry.     Addendum:  Patient requested a prophylactic for yeast infections as she often gets them with antibiotics. I ordered fluconazole X 1 and culturelle     Acute kidney injury  -Likely due to relative volume depletion  -Improving  -Stopping IV fluids  -A.m. BMP     Sickle cell disease, doubt acute crisis  -Hydrate with IV fluids  -Supplemental oxygen as needed  -Pain medication as needed          Diet: Regular Diet Adult    DVT Prophylaxis: Enoxaparin (Lovenox) SQ  Tejeda Catheter: Not present  Lines: None     Cardiac Monitoring: ACTIVE order. Indication: ICU  Code Status: Full Code      Clinically Significant Risk Factors Present on Admission          # Hypocalcemia: Lowest Ca = 7.8 mg/dL in last 2 days, will monitor and replace as appropriate                         Disposition Plan     Expected Discharge Date: 01/08/2024                    Rio Odell MD  Hospitalist Service  [facility]  Securely message with [employer] (more info)  Text page via [employer] Paging/Directory   ______________________________________________________________________    Interval History   Feeling better but still with some back pain and some  pleuritic chest discomfort. No nausea or vomiting.     Physical Exam   Vital Signs: Temp: 98.7  F (37.1  C) Temp src: Oral BP: 91/59 Pulse: 106   Resp: 10 SpO2: 94 % O2 Device: Nasal cannula with humidification Oxygen Delivery: 3 LPM  Weight: 136 lbs 10.96 oz    GENERAL:  Comfortable. Cooperative.  PSYCH: pleasant, oriented, No acute distress.  EYES: PERRLA, Normal conjunctiva.  HEART:  Regular rate and rhythm. No JVD. Pulses normal. No edema.  LUNGS:  Clear to auscultation, normal Respiratory effort.  ABDOMEN:  Soft, no hepatosplenomegaly, normal bowel sounds.  EXTREMETIES: No clubbing, cyanosis or ischemia  SKIN:  Dry to touch, No rash.      Medical Decision Making       45 MINUTES SPENT BY ME on the date of service doing chart review, history, exam, documentation & further activities per the note.      Data     I have personally reviewed the following data over the past 24 hrs:    26.4 (H)  \   7.6 (L)   / 265     134 (L) 105 5.7 (L) /  111 (H)   3.4 21 (L) 1.00 (H) \       Imaging results reviewed over the past 24 hrs:   No results found for this or any previous visit (from the past 24 hour(s)).  Recent Labs   Lab 01/07/24  1226 01/07/24  0518 01/07/24  0346 01/06/24  1516 01/06/24  1231 01/06/24  0805 01/05/24  2114   WBC  --  26.4*  --   --   --  31.6* 33.9*   HGB  --  7.6*  --   --   --  8.2* 10.0*   MCV  --  83  --   --   --  84 85   PLT  --  265  --   --   --  278 345   NA  --  134*  --   --  135  --  134*   POTASSIUM  --  3.4  --   --  3.8  --  4.1   CHLORIDE  --  105  --   --  109*  --  98   CO2  --  21*  --   --  22  --  23   BUN  --  5.7*  --   --  8.6  --  12.8   CR  --  1.00*  --   --  1.19*  --  1.35*   ANIONGAP  --  8  --   --  4*  --  13   TOOTIE  --  7.9*  --   --  7.8*  --  8.8   * 142* 114*   < > 126*  --  116*   ALBUMIN  --   --   --   --   --   --  4.0   PROTTOTAL  --   --   --   --   --   --  8.0   BILITOTAL  --   --   --   --   --   --  1.6*   ALKPHOS  --   --   --   --   --   --  65    ALT  --   --   --   --   --   --  25   AST  --   --   --   --   --   --  42    < > = values in this interval not displayed.

## 2024-01-07 NOTE — PHARMACY-ADMISSION MEDICATION HISTORY
Pharmacist Admission Medication History    Admission medication history is complete. The information provided in this note is only as accurate as the sources available at the time of the update.    Information Source(s): Patient via in-person    Pertinent Information: Bridgett said she hasn't taken any medications for more than a month because she ran out of most of them, including her loestrin. I'm not sure which version of loestrin she takes so I could not add to her PTA list.    Changes made to PTA medication list:  Added: Claritin  Deleted: None  Changed: None    Medication Affordability:       Allergies reviewed with patient and updates made in EHR: yes    Medication History Completed By: Rio Hernadnez [facility] 1/6/2024 6:49 PM    PTA Med List   Medication Sig Last Dose    loratadine (CLARITIN) 10 MG tablet Take 10 mg by mouth daily More than a month    Multiple Vitamins-Minerals (MULTIVITAMIN GUMMIES WOMENS) CHEW Take 1 Piece by mouth daily More than a month

## 2024-01-07 NOTE — PLAN OF CARE
Goal Outcome Evaluation:    ICU End of Shift Summary.  For vital signs and complete assessments, please see documentation flowsheets.      Pertinent assessments: pt a/o x4, makes needs known. Sinus tach, 120s. BP stable, soft after giving dilaudid but map >65. On RA, LS clear. Tolerating a regular diet. Ast 1 to bathroom, voiding well no BM. 2 PIV, /hr. complains for some neck tenderness and chest tightness pt reports has been ongoing for a few days.  Major Shift Events: New admit. Dilaudid x1 for neck pain  Plan (Upcoming Events): continue with ICU cares, abx  Discharge/Transfer Needs: tbd     Bedside Shift Report Completed : y  Bedside Safety Check Completed:    y

## 2024-01-07 NOTE — PLAN OF CARE
Neuro: alert, oriented, pleasant  Cardiac: SR/ST to 120's, BP with MAPs 60-70's.   Pulm: LS clear, no cough, c/o congestion, on NC desats to mid/low 80's on RA  GI: BS present, nausea x 1 with medication,   : voiding without difficulty  ID: T max 103, tylenol given x 2, had shivers x 1, on cefepime  Skin: c/o itching, lotion applied, no rash noted  Access:2 PIVs    Plan: continue to monitor temp and BP, receiving antibiotics, encourage hydration

## 2024-01-07 NOTE — PLAN OF CARE
Goal Outcome Evaluation:     Pertinent assessments: pt a/o x4, makes needs known. Sinus rhythm. BP stable On RA, LS clear. Tolerating a regular diet. Ast 1 to bathroom, voiding well no BM. 1 PIV, SL. complains of neck soreness and back pain, Tpump ordered, pt says helping.  Major Shift Events: now tele overflow pt  Plan (Upcoming Events): can transfer out of icu when bed becomes available  Discharge/Transfer Needs: possible discharge tomorrow. Home with oral abx. Blood cultures still pending     Bedside Shift Report Completed : y  Bedside Safety Check Completed:    y

## 2024-01-08 LAB
ANION GAP SERPL CALCULATED.3IONS-SCNC: 7 MMOL/L (ref 7–15)
BUN SERPL-MCNC: 4.1 MG/DL (ref 6–20)
CALCIUM SERPL-MCNC: 8.2 MG/DL (ref 8.6–10)
CHLORIDE SERPL-SCNC: 105 MMOL/L (ref 98–107)
CREAT SERPL-MCNC: 0.91 MG/DL (ref 0.51–0.95)
DEPRECATED HCO3 PLAS-SCNC: 24 MMOL/L (ref 22–29)
EGFRCR SERPLBLD CKD-EPI 2021: 88 ML/MIN/1.73M2
ERYTHROCYTE [DISTWIDTH] IN BLOOD BY AUTOMATED COUNT: 15.6 % (ref 10–15)
GLUCOSE SERPL-MCNC: 128 MG/DL (ref 70–99)
HCT VFR BLD AUTO: 20.1 % (ref 35–47)
HGB BLD-MCNC: 7.4 G/DL (ref 11.7–15.7)
HOLD SPECIMEN: NORMAL
MCH RBC QN AUTO: 30.5 PG (ref 26.5–33)
MCHC RBC AUTO-ENTMCNC: 36.8 G/DL (ref 31.5–36.5)
MCV RBC AUTO: 83 FL (ref 78–100)
PLATELET # BLD AUTO: 302 10E3/UL (ref 150–450)
POTASSIUM SERPL-SCNC: 3.7 MMOL/L (ref 3.4–5.3)
RBC # BLD AUTO: 2.43 10E6/UL (ref 3.8–5.2)
SODIUM SERPL-SCNC: 136 MMOL/L (ref 135–145)
WBC # BLD AUTO: 24.1 10E3/UL (ref 4–11)

## 2024-01-08 PROCEDURE — 250N000013 HC RX MED GY IP 250 OP 250 PS 637: Performed by: INTERNAL MEDICINE

## 2024-01-08 PROCEDURE — 250N000011 HC RX IP 250 OP 636: Performed by: INTERNAL MEDICINE

## 2024-01-08 PROCEDURE — 85027 COMPLETE CBC AUTOMATED: CPT | Performed by: INTERNAL MEDICINE

## 2024-01-08 PROCEDURE — 120N000004 HC R&B MS OVERFLOW

## 2024-01-08 PROCEDURE — 80048 BASIC METABOLIC PNL TOTAL CA: CPT | Performed by: INTERNAL MEDICINE

## 2024-01-08 PROCEDURE — 36415 COLL VENOUS BLD VENIPUNCTURE: CPT | Performed by: INTERNAL MEDICINE

## 2024-01-08 PROCEDURE — 99232 SBSQ HOSP IP/OBS MODERATE 35: CPT | Performed by: INTERNAL MEDICINE

## 2024-01-08 RX ADMIN — ACETAMINOPHEN 650 MG: 325 TABLET, FILM COATED ORAL at 22:36

## 2024-01-08 RX ADMIN — OXYCODONE HYDROCHLORIDE 2.5 MG: 5 TABLET ORAL at 08:20

## 2024-01-08 RX ADMIN — OXYCODONE HYDROCHLORIDE 5 MG: 5 TABLET ORAL at 18:01

## 2024-01-08 RX ADMIN — ACETAMINOPHEN 650 MG: 325 TABLET, FILM COATED ORAL at 14:21

## 2024-01-08 RX ADMIN — SENNOSIDES AND DOCUSATE SODIUM 1 TABLET: 50; 8.6 TABLET ORAL at 22:32

## 2024-01-08 RX ADMIN — Medication 1 CAPSULE: at 08:18

## 2024-01-08 RX ADMIN — ACETAMINOPHEN 650 MG: 325 TABLET, FILM COATED ORAL at 04:27

## 2024-01-08 RX ADMIN — ACETAMINOPHEN 650 MG: 325 TABLET, FILM COATED ORAL at 18:01

## 2024-01-08 RX ADMIN — OXYCODONE HYDROCHLORIDE 5 MG: 5 TABLET ORAL at 03:58

## 2024-01-08 RX ADMIN — HYDROMORPHONE HYDROCHLORIDE 0.3 MG: 1 INJECTION, SOLUTION INTRAMUSCULAR; INTRAVENOUS; SUBCUTANEOUS at 03:56

## 2024-01-08 RX ADMIN — ONDANSETRON 4 MG: 4 TABLET, ORALLY DISINTEGRATING ORAL at 06:52

## 2024-01-08 RX ADMIN — LEVOFLOXACIN 500 MG: 500 INJECTION, SOLUTION INTRAVENOUS at 14:37

## 2024-01-08 RX ADMIN — PROCHLORPERAZINE MALEATE 10 MG: 5 TABLET ORAL at 10:20

## 2024-01-08 RX ADMIN — ONDANSETRON 4 MG: 4 TABLET, ORALLY DISINTEGRATING ORAL at 18:36

## 2024-01-08 RX ADMIN — OXYCODONE HYDROCHLORIDE 5 MG: 5 TABLET ORAL at 14:21

## 2024-01-08 ASSESSMENT — ACTIVITIES OF DAILY LIVING (ADL)
ADLS_ACUITY_SCORE: 35
ADLS_ACUITY_SCORE: 35
ADLS_ACUITY_SCORE: 18
ADLS_ACUITY_SCORE: 35
ADLS_ACUITY_SCORE: 18
ADLS_ACUITY_SCORE: 35
ADLS_ACUITY_SCORE: 18
ADLS_ACUITY_SCORE: 35

## 2024-01-08 NOTE — PROGRESS NOTES
ICU End of Shift Summary.  For vital signs and complete assessments, please see documentation flowsheets.      Pertinent assessments:   Neuro: Pain abd/back/flank - Rx narcotic helped  Cardiac: wdl  Resp: WDL, LS clear  GI: WDL  :Voiding without difficulty - Kaity, clear   Lines: piv    Plan (Upcoming Events): Trans/DC  Discharge/Transfer Needs: OP pain control     Bedside Shift Report Completed : y  Bedside Safety Check Completed: y

## 2024-01-08 NOTE — PROGRESS NOTES
[facility]    Medicine Progress Note - Hospitalist Service    Date of Admission:  1/6/2024    Assessment & Plan     Shanel Rob is a 28 year old female with history of sickle cell disease, chest syndrome, stroke related to sickle cell disease as a child, and pneumonia.  She is visiting from [place].  She presented to the [facility] ED on 1/5/2024 with fever, tachycardia, chest pain, headache, back pain, and chills.  He denied cough, dysuria, and diarrhea.  Her symptoms had started a day and a half earlier.  She was having fevers as high as 103.  She was taking ibuprofen with improvement of fevers.  This persisted so she went to a [facility] clinic.  At the minute clinic she had testing for influenza which was negative.  She had tested negative for COVID-19 at home.  She was referred to the [facility] ED for evaluation.  Emergency department evaluation showed heart rate in the 110s, temperature 99.4, and otherwise unremarkable vital signs.  Laboratory evaluation showed creatinine 1.35, total bilirubin 1.6, ferritin 522, iron 15, undetectable troponin, lactic acid 0.9, white blood cells 33.9, hemoglobin 10, reticulocyte count 4.7, negative testing for COVID/influenza/RSV, negative monoscreen, and unremarkable venous blood gas.  Chest x-ray showed no acute process.  CT of chest showed no pulmonary embolism or acute process. CT of abdomen and pelvis showed bilateral pyelonephritis, right greater than left.  She was started on Rocephin and admitted to [facility].  She was boarding in the emergency department due to hospital bed crisis. She developed fever to the 103.1 with heart rate up to the 140s.  She developed hypotension with blood pressure briefly in the 70s systolic.  Code rapid response was called.  She was given IV fluid bolus and her antibiotics were broadened to cefepime and Flagyl.  Peripheral Levophed was going to be started but ultimately was not.  She remained  febrile.  She was transferred here to the ICU because there were no ICU beds available at [facility].  When I saw her she was feeling better.  She was still fatigued and with tachycardia.  Blood pressure had improved.  Cefepime was continued but changed to Levaquin after she developed some itching and chest tightness after cefepime. After admission urine culture E. Coli.      Problem list:     Sepsis due to acute pyelonephritis with E. coli  Leukocytosis  Tachycardia  High fever  -Improving. Urine culture is growing E. Coli.   -BP and heart rate are better but she is still having nausea and back pain.   -Continue Levaquin (PCN allergy and reaction to cefepime) and plan on 10-14 day course of treatment in total.   -Fluconazole given X 1 per patient request. Also, I started culturelle.  -Follow blood cultures  -Regular diet as tolerated.   -Pain medication as needed for pyelonephritis  -Ok to transfer out of ICU to med/surg telemetry.   -AM CBC     Acute kidney injury  -Likely due to relative volume depletion  -Resolved  -Off IV fluids     Sickle cell disease, doubt acute crisis  -Hydrated with IV fluids  -HGB has dropped from 10 to mid 7's- suspect dilutional due to IVF for dehydration and sepsis  -Supplemental oxygen as needed  -Pain medication as needed    Disposition  -Getting better but not feeling well yet. Still with nausea and difficulty tolerating solid foods. Still some back pain. Keep another day and reassess tomorrow. Likely home in 1-2 days. Transferring out of ICU today.           Diet: Regular Diet Adult    DVT Prophylaxis: Enoxaparin (Lovenox) SQ  Tejeda Catheter: Not present  Lines: None     Cardiac Monitoring: ACTIVE order. Indication: ICU  Code Status: Full Code      Clinically Significant Risk Factors                                    Disposition Plan     Expected Discharge Date: 01/08/2024                    Rio Odell MD  Hospitalist Service  [facility]  Hospital  Securely message with [employer] (more info)  Text page via [facility] Paging/Directory   ______________________________________________________________________    Interval History   Still with some nausea and not tolerating solid food. Some back pain.     Physical Exam   Vital Signs: Temp: 98.1  F (36.7  C) Temp src: Oral BP: 105/71 Pulse: 89   Resp: (!) 8 SpO2: 93 % O2 Device: None (Room air)    Weight: 136 lbs 10.96 oz    GENERAL:  Comfortable at rest. Cooperative.  PSYCH: pleasant, oriented, No acute distress.  EYES: PERRLA, Normal conjunctiva.  HEART:  Regular rate and rhythm. No JVD. Pulses normal. No edema.  LUNGS:  Clear to auscultation, normal Respiratory effort.  ABDOMEN:  Soft, no hepatosplenomegaly, normal bowel sounds.  EXTREMETIES: No clubbing, cyanosis or ischemia  SKIN:  Dry to touch, No rash.      Medical Decision Making       40 MINUTES SPENT BY ME on the date of service doing chart review, history, exam, documentation & further activities per the note.      Data     I have personally reviewed the following data over the past 24 hrs:    24.1 (H)  \   7.4 (L)   / 302     136 105 4.1 (L) /  128 (H)   3.7 24 0.91 \       Imaging results reviewed over the past 24 hrs:   No results found for this or any previous visit (from the past 24 hour(s)).  Recent Labs   Lab 01/08/24  0602 01/07/24  1604 01/07/24  1226 01/07/24  0518 01/06/24  1516 01/06/24  1231 01/06/24  0805 01/05/24  2114   WBC 24.1*  --   --  26.4*  --   --  31.6* 33.9*   HGB 7.4*  --   --  7.6*  --   --  8.2* 10.0*   MCV 83  --   --  83  --   --  84 85     --   --  265  --   --  278 345     --   --  134*  --  135  --  134*   POTASSIUM 3.7  --   --  3.4  --  3.8  --  4.1   CHLORIDE 105  --   --  105  --  109*  --  98   CO2 24  --   --  21*  --  22  --  23   BUN 4.1*  --   --  5.7*  --  8.6  --  12.8   CR 0.91  --   --  1.00*  --  1.19*  --  1.35*   ANIONGAP 7  --   --  8  --  4*  --  13   TOOTIE 8.2*  --   --  7.9*  --  7.8*  --   8.8   * 84 111* 142*   < > 126*  --  116*   ALBUMIN  --   --   --   --   --   --   --  4.0   PROTTOTAL  --   --   --   --   --   --   --  8.0   BILITOTAL  --   --   --   --   --   --   --  1.6*   ALKPHOS  --   --   --   --   --   --   --  65   ALT  --   --   --   --   --   --   --  25   AST  --   --   --   --   --   --   --  42    < > = values in this interval not displayed.

## 2024-01-08 NOTE — PROGRESS NOTES
A&OX4, VSS, tele: SR, Afebrile, C/O back/neck pain oxycodone PRN given with some relief, C/O nausea compazine PRN given, unable to eat much of her breakfast lack of appetite/nausea, up SBA to Bathroom, voiding w/o difficulty, PIV to the left arm SL.    Continue treatment with Antibiotics for infection with possible discharge tomorrow.

## 2024-01-09 ENCOUNTER — APPOINTMENT (OUTPATIENT)
Dept: ULTRASOUND IMAGING | Facility: CLINIC | Age: 29
DRG: 872 | End: 2024-01-09
Attending: STUDENT IN AN ORGANIZED HEALTH CARE EDUCATION/TRAINING PROGRAM
Payer: COMMERCIAL

## 2024-01-09 LAB
CREAT SERPL-MCNC: 0.84 MG/DL (ref 0.51–0.95)
D DIMER PPP FEU-MCNC: 4.78 UG/ML FEU (ref 0–0.5)
EGFRCR SERPLBLD CKD-EPI 2021: >90 ML/MIN/1.73M2
ERYTHROCYTE [DISTWIDTH] IN BLOOD BY AUTOMATED COUNT: 15.6 % (ref 10–15)
HCT VFR BLD AUTO: 20.2 % (ref 35–47)
HGB BLD-MCNC: 7.4 G/DL (ref 11.7–15.7)
HOLD SPECIMEN: NORMAL
MCH RBC QN AUTO: 30.1 PG (ref 26.5–33)
MCHC RBC AUTO-ENTMCNC: 36.6 G/DL (ref 31.5–36.5)
MCV RBC AUTO: 82 FL (ref 78–100)
PLATELET # BLD AUTO: 388 10E3/UL (ref 150–450)
RBC # BLD AUTO: 2.46 10E6/UL (ref 3.8–5.2)
WBC # BLD AUTO: 18.6 10E3/UL (ref 4–11)

## 2024-01-09 PROCEDURE — 93970 EXTREMITY STUDY: CPT

## 2024-01-09 PROCEDURE — 250N000013 HC RX MED GY IP 250 OP 250 PS 637: Performed by: INTERNAL MEDICINE

## 2024-01-09 PROCEDURE — 36415 COLL VENOUS BLD VENIPUNCTURE: CPT | Performed by: STUDENT IN AN ORGANIZED HEALTH CARE EDUCATION/TRAINING PROGRAM

## 2024-01-09 PROCEDURE — 99232 SBSQ HOSP IP/OBS MODERATE 35: CPT | Performed by: STUDENT IN AN ORGANIZED HEALTH CARE EDUCATION/TRAINING PROGRAM

## 2024-01-09 PROCEDURE — 250N000013 HC RX MED GY IP 250 OP 250 PS 637: Performed by: STUDENT IN AN ORGANIZED HEALTH CARE EDUCATION/TRAINING PROGRAM

## 2024-01-09 PROCEDURE — 120N000004 HC R&B MS OVERFLOW

## 2024-01-09 PROCEDURE — 99222 1ST HOSP IP/OBS MODERATE 55: CPT | Performed by: INTERNAL MEDICINE

## 2024-01-09 PROCEDURE — 250N000011 HC RX IP 250 OP 636: Performed by: INTERNAL MEDICINE

## 2024-01-09 PROCEDURE — 36415 COLL VENOUS BLD VENIPUNCTURE: CPT | Performed by: INTERNAL MEDICINE

## 2024-01-09 PROCEDURE — 85379 FIBRIN DEGRADATION QUANT: CPT | Performed by: STUDENT IN AN ORGANIZED HEALTH CARE EDUCATION/TRAINING PROGRAM

## 2024-01-09 PROCEDURE — 85027 COMPLETE CBC AUTOMATED: CPT | Performed by: INTERNAL MEDICINE

## 2024-01-09 PROCEDURE — 82565 ASSAY OF CREATININE: CPT | Performed by: INTERNAL MEDICINE

## 2024-01-09 RX ORDER — FOLIC ACID 1 MG/1
1 TABLET ORAL DAILY
Status: DISCONTINUED | OUTPATIENT
Start: 2024-01-09 | End: 2024-01-10 | Stop reason: HOSPADM

## 2024-01-09 RX ORDER — ACETAMINOPHEN 325 MG/1
975 TABLET ORAL 3 TIMES DAILY
Status: DISCONTINUED | OUTPATIENT
Start: 2024-01-09 | End: 2024-01-10 | Stop reason: HOSPADM

## 2024-01-09 RX ORDER — OXYCODONE HYDROCHLORIDE 5 MG/1
5-10 TABLET ORAL EVERY 4 HOURS PRN
Status: DISCONTINUED | OUTPATIENT
Start: 2024-01-09 | End: 2024-01-10 | Stop reason: HOSPADM

## 2024-01-09 RX ORDER — FERROUS SULFATE 325(65) MG
325 TABLET ORAL DAILY
Status: DISCONTINUED | OUTPATIENT
Start: 2024-01-09 | End: 2024-01-10

## 2024-01-09 RX ADMIN — FOLIC ACID 1 MG: 1 TABLET ORAL at 16:03

## 2024-01-09 RX ADMIN — ENOXAPARIN SODIUM 40 MG: 40 INJECTION SUBCUTANEOUS at 16:03

## 2024-01-09 RX ADMIN — LEVOFLOXACIN 750 MG: 500 TABLET, FILM COATED ORAL at 09:33

## 2024-01-09 RX ADMIN — SENNOSIDES AND DOCUSATE SODIUM 2 TABLET: 50; 8.6 TABLET ORAL at 10:06

## 2024-01-09 RX ADMIN — ACETAMINOPHEN 975 MG: 325 TABLET, FILM COATED ORAL at 12:15

## 2024-01-09 RX ADMIN — Medication 1 CAPSULE: at 08:52

## 2024-01-09 RX ADMIN — MAGNESIUM HYDROXIDE 30 ML: 400 SUSPENSION ORAL at 12:54

## 2024-01-09 RX ADMIN — FERROUS SULFATE TAB 325 MG (65 MG ELEMENTAL FE) 325 MG: 325 (65 FE) TAB at 16:38

## 2024-01-09 RX ADMIN — OXYCODONE HYDROCHLORIDE 5 MG: 5 TABLET ORAL at 16:03

## 2024-01-09 RX ADMIN — OXYCODONE HYDROCHLORIDE 5 MG: 5 TABLET ORAL at 21:38

## 2024-01-09 RX ADMIN — OXYCODONE HYDROCHLORIDE 5 MG: 5 TABLET ORAL at 08:52

## 2024-01-09 RX ADMIN — ACETAMINOPHEN 650 MG: 325 TABLET, FILM COATED ORAL at 04:47

## 2024-01-09 RX ADMIN — ACETAMINOPHEN 975 MG: 325 TABLET, FILM COATED ORAL at 20:31

## 2024-01-09 RX ADMIN — HYDROMORPHONE HYDROCHLORIDE 0.3 MG: 1 INJECTION, SOLUTION INTRAMUSCULAR; INTRAVENOUS; SUBCUTANEOUS at 01:21

## 2024-01-09 ASSESSMENT — ACTIVITIES OF DAILY LIVING (ADL)
ADLS_ACUITY_SCORE: 20
ADLS_ACUITY_SCORE: 22
ADLS_ACUITY_SCORE: 22
ADLS_ACUITY_SCORE: 20
ADLS_ACUITY_SCORE: 22
ADLS_ACUITY_SCORE: 20
ADLS_ACUITY_SCORE: 20
ADLS_ACUITY_SCORE: 22
ADLS_ACUITY_SCORE: 22
ADLS_ACUITY_SCORE: 20

## 2024-01-09 NOTE — PLAN OF CARE
VSS on RA.  Lungs clear.  Bowels active.  Senna and milk of magnesia given for bloating and constipation.  Patient able to have bowel movement after.  Voiding.  Tylenol and oxycodone for pain.  Patient complained of headache and bilateral shoulder/chest pain throughout the day.  MD aware and adjusted dose of oxycodone.  Patient walked the halls frequently, took a shower, and brushed her teeth.  Tolerating regular diet with good PO.  Independent in room.  Plan is possible discharge tomorrow.

## 2024-01-09 NOTE — PLAN OF CARE
Goal Outcome Evaluation:      Plan of Care Reviewed With: patient    Overall Patient Progress: improvingOverall Patient Progress: improving  [id number]  Afebrile, VSS, pt alert, talkative. Slightly unsteady when up to BR and will continue to use call light for stand by assistance. Lungs clear, did request senna this evening. Denied pain in chest or back but did c/o headache at 2230 and tylenol given. IV remained saline locked. Ate well for supper, no N/V. Up in chair later in shift. Pt stable, doing well,  updated on plan of care.

## 2024-01-09 NOTE — PROGRESS NOTES
Paged by RN that patient was interested in talking to a hematology doctor.  She is here currently with sepsis due to acute pyelonephritis and being treated for this.  Does have a history of sickle cell disease, but does not appear that she is in acute sickle cell crisis during this admission.  Per chart review appears that physician at [facility] had ordered a consult to hematology when her condition was more undifferentiated, and patient would still like to talk to her hematologist.  Will defer hematology consult to daytime provider.    Harman Campbell MD

## 2024-01-09 NOTE — PLAN OF CARE
Goal Outcome Evaluation: [id number]    Vitals: VSS. Afebrile. Standby assist in room. Denies dizziness.   Resp: WDL.  LS Clear and equal   GI/: WDL passing flatus, no BM this shift, BS active and audible.  Denies N/V. Tolerating regular diet. Voiding WDL.  IV: WDL dressing c/d/I, saline locked  Pain/Comfort: 2-9/10. Pt complains of shoulder/head/chest pain. Dilaudid given x1.  Tylenol given x 1  Skin: WDL  Plan: maintain adequate hydration, monitor I/O,  and provide for comfort and needs.

## 2024-01-09 NOTE — PROGRESS NOTES
[facility]    Medicine Progress Note - Hospitalist Service    Date of Admission:  1/6/2024    Assessment & Plan   Summary of stay:  Shanel Rob is a 28 year old female with history of sickle cell disease, chest syndrome, stroke related to sickle cell disease as a child, and pneumonia.  She is visiting from [place].  She presented to the [facility] ED on 1/5/2024 with fever, tachycardia, chest pain, headache, back pain, and chills.  He denied cough, dysuria, and diarrhea.  Her symptoms had started a day and a half earlier.  She was having fevers as high as 103.  She was taking ibuprofen with improvement of fevers.  This persisted so she went to a minute clinic.  At the minute clinic she had testing for influenza which was negative.  She had tested negative for COVID-19 at home.  She was referred to the [facility] ED for evaluation.  Emergency department evaluation showed heart rate in the 110s, temperature 99.4, and otherwise unremarkable vital signs.  Laboratory evaluation showed creatinine 1.35, total bilirubin 1.6, ferritin 522, iron 15, undetectable troponin, lactic acid 0.9, white blood cells 33.9, hemoglobin 10, reticulocyte count 4.7, negative testing for COVID/influenza/RSV, negative monoscreen, and unremarkable venous blood gas.  Chest x-ray showed no acute process.  CT of chest showed no pulmonary embolism or acute process. CT of abdomen and pelvis showed bilateral pyelonephritis, right greater than left.  She was started on Rocephin and admitted to [facility].  She was boarding in the emergency department due to hospital bed crisis. She developed fever to the 103.1 with heart rate up to the 140s.  She developed hypotension with blood pressure briefly in the 70s systolic.  Code rapid response was called.  She was given IV fluid bolus and her antibiotics were broadened to cefepime and Flagyl.  Peripheral Levophed was going to be started but ultimately was not.   She remained febrile.  She was transferred here to the ICU because there were no ICU beds available at [facility].  When I saw her she was feeling better.  She was still fatigued and with tachycardia.  Blood pressure had improved. After admission urine culture E. Coli.      Problem list:     Sepsis due to acute pyelonephritis with E. coli  -On admission patient was tachycardic, leukocytosis, and high fever.. Urine culture is growing E. Coli.   -Had some itching and chest tightness without rash after cefepime. She has a PCN allergy. Change PCN to Levaquin.   -Stop IV Levaquin-start p.o. Levaquin 750 mg daily.  Plan for a total 10-day antibiotic course including IV antibiotics.  End date antibiotics 1/15.  -blood cultures NGTD  -Pain management with Tylenol 975 mg 3 times daily and oxycodone 5 to 10 mg every 4 hours as needed  -Ok to transfer out of ICU to med/surg telemetry.     Addendum:  Patient requested a prophylactic for yeast infections as she often gets them with antibiotics.  Received fluconazole on 1/7.    Chest tightness  Pleuritic chest pain  Reported mid chest tightness as well as pleuritic chest pain. Noted to have chest wall tenderness on palpation. On RA. Not tachypneic. CTA on admission without PE. Lovenox was ordered but patient has been refusing to take it. Per patient she is ambulating good and gets out of bed 5-6 times per day. Elevated d-dimer at 4.78. She does not think that her symptoms are related to pain     -US dopplers lower extremities.     Acute kidney injury---resolved  -Creatinine elevated at 1.35 on admission.  -Likely due to relative volume depletion.  Improved with IVF    Sickle cell disease, doubt acute crisis  Per patient she does not get sickle cell pain crisis very often.  Last time she had pain episodes may be 4 to 5 months back which she was able to manage with good hydration at home.  Per patient her last hospitalization was about a decade ago for sickle cell pain  crisis.  She is from [place] and visiting [place] currently.  She wants to speak with hematology about her sickle cell disease before getting on plane back to home.  -Hematology consult placed          Diet: Regular Diet Adult    DVT Prophylaxis: Enoxaparin (Lovenox) SQ  Tejeda Catheter: Not present  Lines: None     Cardiac Monitoring: None  Code Status: Full Code      Clinically Significant Risk Factors                                    Disposition Plan     Expected Discharge Date: 01/10/2024                    Nirali Ray MD  Hospitalist Service  [facility] [place] [facility]  Securely message with [employer] (more info)  Text page via [employer] Paging/Directory   ______________________________________________________________________    Interval History   No acute events overnight. Patient reports ongoing sweating and feeling hot/cold. No fever. Reported pleuritic chest pain as well as tightness since admission.      4 point ROS is otherwise negative.     Physical Exam   Vital Signs: Temp: 98.5  F (36.9  C) Temp src: Oral BP: 102/75 Pulse: 72   Resp: 18 SpO2: 96 % O2 Device: None (Room air)    Weight: 136 lbs 10.96 oz    GENERAL:  Comfortable. Cooperative.  PSYCH: pleasant, oriented, No acute distress.  EYES: PERRLA, Normal conjunctiva.  Chest:chest wall tenderness,  regular rate and rhythm. No JVD. Pulses normal. No edema.  LUNGS:  Clear to auscultation, normal Respiratory effort.  ABDOMEN:  Soft, no hepatosplenomegaly, normal bowel sounds.  EXTREMETIES: No clubbing, cyanosis or ischemia  SKIN:  Dry to touch, No rash.      Medical Decision Making       44 MINUTES SPENT BY ME on the date of service doing chart review, history, exam, documentation & further activities per the note.      Data     I have personally reviewed the following data over the past 24 hrs:    18.6 (H)  \   7.4 (L)   / 388     N/A N/A N/A /  N/A   N/A N/A 0.84 \     INR:  N/A PTT:  N/A   D-dimer:  4.78 (H) Fibrinogen:  N/A       Imaging results  reviewed over the past 24 hrs:   No results found for this or any previous visit (from the past 24 hour(s)).  Recent Labs   Lab 01/09/24  0758 01/08/24  0602 01/07/24  1604 01/07/24  1226 01/07/24  0518 01/06/24  1516 01/06/24  1231 01/06/24  0805 01/05/24  2114   WBC 18.6* 24.1*  --   --  26.4*  --   --    < > 33.9*   HGB 7.4* 7.4*  --   --  7.6*  --   --    < > 10.0*   MCV 82 83  --   --  83  --   --    < > 85    302  --   --  265  --   --    < > 345   NA  --  136  --   --  134*  --  135  --  134*   POTASSIUM  --  3.7  --   --  3.4  --  3.8  --  4.1   CHLORIDE  --  105  --   --  105  --  109*  --  98   CO2  --  24  --   --  21*  --  22  --  23   BUN  --  4.1*  --   --  5.7*  --  8.6  --  12.8   CR 0.84 0.91  --   --  1.00*  --  1.19*  --  1.35*   ANIONGAP  --  7  --   --  8  --  4*  --  13   TOOTIE  --  8.2*  --   --  7.9*  --  7.8*  --  8.8   GLC  --  128* 84 111* 142*   < > 126*  --  116*   ALBUMIN  --   --   --   --   --   --   --   --  4.0   PROTTOTAL  --   --   --   --   --   --   --   --  8.0   BILITOTAL  --   --   --   --   --   --   --   --  1.6*   ALKPHOS  --   --   --   --   --   --   --   --  65   ALT  --   --   --   --   --   --   --   --  25   AST  --   --   --   --   --   --   --   --  42    < > = values in this interval not displayed.

## 2024-01-09 NOTE — CONSULTS
This consult has been requested by Dr. Nirali Ray for sickle cell disease.    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  Patient lives in [facility].  Patient is visiting [place].    Patient generally has been doing well from SC disease. In last 1 year, she had two mild pain crisis.  She was not hospitalized.  She got more pain crisis when she was a child.  Patient gives a history of stroke when she was young.    Patient presented to emergency room on 01/05/2024 because of weakness and chills.  She was also having mild pain in her chest, back and extremities.  She had multiple investigations done.  -WBC of 33.9, hemoglobin 10.0 and platelet 345.  -CMP revealed few abnormalities including creatinine of 1.35 and bilirubin of 1.6.  Creatinine is normal at 0.84 today.  -Influenza negative.  -COVID-19 negative.  -Chest x-ray normal.  -CT chest normal.  No pulmonary embolism.  -CT abdomen and pelvis reveals changes related to bilateral pyelonephritis.  No acute process.  -Blood cultures are negative.  -UA is abnormal.  -Urine culture has come back positive for E. coli.    Patient is on antibiotics.  She is currently on Levaquin.  For pain, she gets oxycodone as needed.    Patient is feeling better.  She is afebrile for the last 3 days.  No shaking or chills.  Pain is mild and well-controlled.    Review of systems  Some headache.  No dizziness.  No shortness of breath.  No cough.  No nausea or vomiting.  She is no longer having urinary burning.  Some frequency.  Urine is dark.  No diarrhea.  Some constipation.  No bleeding.  She is premenopausal.  Last menstrual period was last Tuesday.    Allergies: Reviewed    Medications: Reviewed.    Past medical history:  -Hemoglobin SC disease.  -Cholecystectomy  -Stroke as a child  -Chest syndrome    Social history:  -No smoking.  -Occasional alcohol use.    Exam:  She is alert oriented x 3. Not in any distress.  Vitals: Reviewed  Rest of systems not  examined    Labs: Reviewed.    Assessment:  1.  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  2.  E. coli pyelonephritis.    Recommendation:  -Complete course of antibiotic.  -Drink plenty of fluid.  -Pain medication as needed.  -Folic acid 1 mg a day.  -Oral ferrous sulfate once a day.  -Follow-up with PCP/hematologist in [facility].    Discussion:  1.  Patient has hemoglobin SC disease.  She has been admitted with pyelonephritis.  Patient is not in pain crisis.    Discussed regarding pyelonephritis.  She is on Levaquin.  She will complete the course.  Advised her to drink plenty of fluid.  Pyelonephritis improving.  Clinically she is better.  WBC is also decreasing.    Patient had multiple questions regarding sickle cell disease which were discussed.  Patient has been doing well.  She had only 2 pain crisis last year which did not require any hospitalization.    Possible complications of sickle cell disease discussed.  Patient advised to follow-up with her hematologist in [facility].  Patient said that she has not seen them for quite some time.  Once she returns, she will make an appointment there.    2.  Advised her to take folic acid 1 mg a day.    3.  Patient has normocytic anemia.  This is from sickle cell disease and also from iron deficiency.  Her iron is low.  Ferritin elevated as inflammatory marker.  Patient is premenopausal.  First 2 days of menstrual bleeding are heavy.  Will start her on ferrous sulfate once a day.  She will have labs monitored through her PCP in [place].    4.  Patient is planning to fly back to [place] in next few days.  Advised her to keep herself well-hydrated.  Also advised her to ambulate as much as possible.  In the plane, advised her to go to bathroom 2-3 times so that she can move.    5.  She had few questions which were all answered.  Case discussed with Dr. Nirali Ray.  Hematology/oncology will sign off.    Thanks for the consult.    Total time spent 60 minutes.  Time  spent in today's visit, review of chart/investigations today and documentation today.

## 2024-01-09 NOTE — PLAN OF CARE
Goal Outcome Evaluation:  Updates: Bridgett transferred to peds from the ICU ~1230. VSS, BP soft 103/75. Tmax 99.4. SBA in room due to intermittent dizziness. Pt rested comfortably between cares.  GI/: WDL. Voided x1. Intermittent nausea, zofran given, no emesis. Tolerating regular diet. Decreased appetite, had a bowl of chicken noodle soup today but ordered a dinner tray that she is currently working on.  IV: WDL R forearm, SL b/t abx.  Pain/Comfort: 6-9/10. Pain in whole back, neck, chest discomfort. Tylenol, oxycodone, aqua k heating pad, pillow positioning providing adequate pain management per pt. Pt stated understanding to call out for any increased pain/for more pain meds.  Skin: WDL  Social: Visitor at bedside currently.  Plan: Levaquin q24h, encourage good intake, ensure good urine output, monitor pain and give pain meds prn, and provide for comfort and needs.

## 2024-01-10 VITALS
SYSTOLIC BLOOD PRESSURE: 113 MMHG | DIASTOLIC BLOOD PRESSURE: 76 MMHG | TEMPERATURE: 98.6 F | WEIGHT: 133.1 LBS | OXYGEN SATURATION: 98 % | RESPIRATION RATE: 16 BRPM | BODY MASS INDEX: 20.85 KG/M2 | HEART RATE: 85 BPM

## 2024-01-10 LAB
ERYTHROCYTE [DISTWIDTH] IN BLOOD BY AUTOMATED COUNT: 15.8 % (ref 10–15)
HCT VFR BLD AUTO: 20.4 % (ref 35–47)
HGB BLD-MCNC: 7.5 G/DL (ref 11.7–15.7)
MCH RBC QN AUTO: 30.5 PG (ref 26.5–33)
MCHC RBC AUTO-ENTMCNC: 36.8 G/DL (ref 31.5–36.5)
MCV RBC AUTO: 83 FL (ref 78–100)
PLATELET # BLD AUTO: 453 10E3/UL (ref 150–450)
RBC # BLD AUTO: 2.46 10E6/UL (ref 3.8–5.2)
WBC # BLD AUTO: 20.6 10E3/UL (ref 4–11)

## 2024-01-10 PROCEDURE — 85014 HEMATOCRIT: CPT | Performed by: STUDENT IN AN ORGANIZED HEALTH CARE EDUCATION/TRAINING PROGRAM

## 2024-01-10 PROCEDURE — 250N000011 HC RX IP 250 OP 636: Performed by: INTERNAL MEDICINE

## 2024-01-10 PROCEDURE — 250N000013 HC RX MED GY IP 250 OP 250 PS 637: Performed by: STUDENT IN AN ORGANIZED HEALTH CARE EDUCATION/TRAINING PROGRAM

## 2024-01-10 PROCEDURE — 250N000011 HC RX IP 250 OP 636: Performed by: STUDENT IN AN ORGANIZED HEALTH CARE EDUCATION/TRAINING PROGRAM

## 2024-01-10 PROCEDURE — 250N000013 HC RX MED GY IP 250 OP 250 PS 637: Performed by: INTERNAL MEDICINE

## 2024-01-10 PROCEDURE — 36415 COLL VENOUS BLD VENIPUNCTURE: CPT | Performed by: STUDENT IN AN ORGANIZED HEALTH CARE EDUCATION/TRAINING PROGRAM

## 2024-01-10 PROCEDURE — 99239 HOSP IP/OBS DSCHRG MGMT >30: CPT | Performed by: STUDENT IN AN ORGANIZED HEALTH CARE EDUCATION/TRAINING PROGRAM

## 2024-01-10 RX ORDER — METHOCARBAMOL 500 MG/1
500 TABLET, FILM COATED ORAL 4 TIMES DAILY PRN
Qty: 28 TABLET | Refills: 0 | Status: SHIPPED | OUTPATIENT
Start: 2024-01-10 | End: 2024-01-17

## 2024-01-10 RX ORDER — FERROUS SULFATE 325(65) MG
325 TABLET ORAL EVERY OTHER DAY
Qty: 15 TABLET | Refills: 0 | Status: SHIPPED | OUTPATIENT
Start: 2024-01-12 | End: 2024-02-11

## 2024-01-10 RX ORDER — KETOROLAC TROMETHAMINE 15 MG/ML
15 INJECTION, SOLUTION INTRAMUSCULAR; INTRAVENOUS ONCE
Status: COMPLETED | OUTPATIENT
Start: 2024-01-10 | End: 2024-01-10

## 2024-01-10 RX ORDER — METHOCARBAMOL 500 MG/1
500 TABLET, FILM COATED ORAL 4 TIMES DAILY
Status: DISCONTINUED | OUTPATIENT
Start: 2024-01-10 | End: 2024-01-10 | Stop reason: HOSPADM

## 2024-01-10 RX ORDER — ONDANSETRON 4 MG/1
4 TABLET, ORALLY DISINTEGRATING ORAL EVERY 6 HOURS PRN
Qty: 20 TABLET | Refills: 0 | Status: SHIPPED | OUTPATIENT
Start: 2024-01-10 | End: 2024-01-17

## 2024-01-10 RX ORDER — FERROUS SULFATE 325(65) MG
325 TABLET ORAL EVERY OTHER DAY
Status: DISCONTINUED | OUTPATIENT
Start: 2024-01-12 | End: 2024-01-10 | Stop reason: HOSPADM

## 2024-01-10 RX ORDER — FOLIC ACID 1 MG/1
1 TABLET ORAL DAILY
Qty: 30 TABLET | Refills: 0 | Status: SHIPPED | OUTPATIENT
Start: 2024-01-11

## 2024-01-10 RX ORDER — OXYCODONE HYDROCHLORIDE 5 MG/1
5-10 TABLET ORAL EVERY 4 HOURS PRN
Qty: 10 TABLET | Refills: 0 | Status: SHIPPED | OUTPATIENT
Start: 2024-01-10

## 2024-01-10 RX ORDER — LEVOFLOXACIN 750 MG/1
750 TABLET, FILM COATED ORAL DAILY
Qty: 9 TABLET | Refills: 0 | Status: SHIPPED | OUTPATIENT
Start: 2024-01-11 | End: 2024-01-20

## 2024-01-10 RX ORDER — FLUCONAZOLE 150 MG/1
150 TABLET ORAL
Qty: 1 TABLET | Refills: 0 | Status: SHIPPED | OUTPATIENT
Start: 2024-01-10

## 2024-01-10 RX ORDER — LACTOBACILLUS RHAMNOSUS GG 10B CELL
1 CAPSULE ORAL DAILY
Qty: 30 CAPSULE | Refills: 0 | Status: SHIPPED | OUTPATIENT
Start: 2024-01-11

## 2024-01-10 RX ORDER — POLYETHYLENE GLYCOL 3350 17 G/17G
17 POWDER, FOR SOLUTION ORAL DAILY PRN
Qty: 510 G | Refills: 0 | Status: SHIPPED | OUTPATIENT
Start: 2024-01-10

## 2024-01-10 RX ADMIN — Medication 1 CAPSULE: at 08:19

## 2024-01-10 RX ADMIN — ONDANSETRON 4 MG: 4 TABLET, ORALLY DISINTEGRATING ORAL at 08:35

## 2024-01-10 RX ADMIN — KETOROLAC TROMETHAMINE 15 MG: 15 INJECTION, SOLUTION INTRAMUSCULAR; INTRAVENOUS at 10:33

## 2024-01-10 RX ADMIN — FOLIC ACID 1 MG: 1 TABLET ORAL at 08:20

## 2024-01-10 RX ADMIN — LEVOFLOXACIN 750 MG: 500 TABLET, FILM COATED ORAL at 08:19

## 2024-01-10 RX ADMIN — ACETAMINOPHEN 975 MG: 325 TABLET, FILM COATED ORAL at 13:44

## 2024-01-10 RX ADMIN — OXYCODONE HYDROCHLORIDE 10 MG: 5 TABLET ORAL at 01:07

## 2024-01-10 RX ADMIN — METHOCARBAMOL 500 MG: 500 TABLET ORAL at 08:20

## 2024-01-10 RX ADMIN — ACETAMINOPHEN 975 MG: 325 TABLET, FILM COATED ORAL at 08:19

## 2024-01-10 RX ADMIN — FERROUS SULFATE TAB 325 MG (65 MG ELEMENTAL FE) 325 MG: 325 (65 FE) TAB at 08:19

## 2024-01-10 RX ADMIN — METHOCARBAMOL 500 MG: 500 TABLET ORAL at 13:44

## 2024-01-10 RX ADMIN — OXYCODONE HYDROCHLORIDE 10 MG: 5 TABLET ORAL at 05:04

## 2024-01-10 ASSESSMENT — ACTIVITIES OF DAILY LIVING (ADL)
ADLS_ACUITY_SCORE: 20

## 2024-01-10 NOTE — PROGRESS NOTES
Discharge note:  Pt up ad linda in room, no complaints of any discomfort. Pt did state she was excited and comfortable with going home. IV discontinued, all discharge instructions reviewed. All medication dosages and intervals reviewed. Will continue antibiotics x 9 more days. Discussed symptoms of when to return to DR./ ER Pt ambulatory on discharge.

## 2024-01-10 NOTE — PROVIDER NOTIFICATION
"Paged crosscover    Message @ [phone]    Pt is complaining of chest pain that is radiating to head, shoulders, back, and groin. She describes this pain as \"muscle soreness\" and \"achy\" and \"throbby\". I gave 10mg oxycodone at 0107 with no relief. 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 Is there something more for pain that she can get?    Response from Lane Frias @ 1170    Nothing I can think of, not gonna go up on the narcotics    *no new orders at this time*  "

## 2024-01-10 NOTE — PLAN OF CARE
Afebrile. Headache relived with Tylenol, Robaxin, Oxycodone, Toradol and cold-application. Nausea times one following am medications; relieved with Zofran. Up independently. Voiding. Denies urinary symptoms. Plan for discharge when pt's ride arrives.

## 2024-01-10 NOTE — PLAN OF CARE
Goal Outcome Evaluation:    Vitals: VSS. Afebrile. Independent in room. Denies dizziness. ambulated in sotomayor x1   Resp: WDL.  LS clear and equal bilaterally   GI/: WDL soft and tender upon palpation, passing flatus, BS active and audible.  Denies N/V. Tolerating regular diet. Voiding WDL.   IV: WDL dressing c/d/i, saline locked  Pain/Comfort: 3-8/10. Pt complains of chest pain radiating to head and groin (ko GORDON; see note)  Oxy 5 mg given x1, Oxy 10 mg given x2   Skin: WDL  Plan:  maintain adequate hydration, monitor I/O,  and provide for comfort and needs.

## 2024-01-11 LAB
BACTERIA BLD CULT: NO GROWTH
BACTERIA BLD CULT: NO GROWTH

## 2024-01-12 ENCOUNTER — PATIENT OUTREACH (OUTPATIENT)
Dept: CARE COORDINATION | Facility: CLINIC | Age: 29
End: 2024-01-12
Payer: COMMERCIAL

## 2024-01-12 NOTE — PROGRESS NOTES
3/4/2021         RE: Jhoan Hoffman  [address]  [address]  [facility] [id number]        Dear Colleague,    Thank you for referring your patient, Jhoan Hoffman, to the [facility]. Please see a copy of my visit note below.    Chief Complaint   Patient presents with     RECHECK     2 WEEK F/U     Blood pressure (!) 139/92, pulse 102, weight 87 kg (191 lb 12.8 oz), SpO2 98 %.    Karina Hatch [facility]      Dialysis Access Service   Progress Note    S:  Mr. Hoffman is a 27 year male, being seen today for surgical followup of his dialysis access. He reports no issues with the wound, and  no steal syndrome of the distal extremity. He reports he  initiated HD with a CVC tunneled line at [facility] the day after AV fistula surgery. He is scheduled outpatient HD at [facility] on Monday, Wednesday and Friday. He reports the mid incision of left upper arm racked open a couple days ago with some drainage from site. Dialysis staff took culture from incision site, and result was negative. A small amount drainage on dressing that he changed yesterday. Denies pain or swelling in left arm, tingling/numbness or a change of color/temperature in left hand and fingers. S/P Left upper arm Arteriovenous Fistula revision, basilic vein transposition (2nd stage procedure) on 2/16/2021.      O:  Pulse:  [102] 102  BP: (139)/(92) 139/92  SpO2:  [98 %] 98 %  GENERAL: alert, cooperative  Circulation:   Radial pulse 3+  Ulnar pulse  3+   Capillary refill:  capillary refill < 2 sec    Sensory exam:   arm: Normal   [x]           Abnormal   []          Comment:    hand: Normal   [x]           Abnormal   []          Comment:   Motor exam:   arm: Normal   [x]           Abnormal   []          Comment:    hand: Normal   [x]           Abnormal   []          Comment:    Access: L upper extremity wound(s) healing, non-tender, 2/3 incisions with derma bond intact. Mild venous hypertension, ++ thrill and bruit via hand held doppler  Clinic Care Coordination Contact  [facility]: Post-Discharge Note  SITUATION                                                      Admission:    Admission Date: 01/06/24   Reason for Admission: Sepsis  Discharge:   Discharge Date: 01/10/24  Discharge Diagnosis: Sepsis    BACKGROUND                                                      Per hospital discharge summary and inpatient provider notes:    Shanel Rob is a 28 year old female with history of sickle cell disease, chest syndrome, stroke related to sickle cell disease as a child, and pneumonia.  She is visiting from [place].  She presented to the [facility] ED on 1/5/2024 with fever, tachycardia, chest pain, headache, back pain, and chills.  He denied cough, dysuria, and diarrhea.  Her symptoms had started a day and a half earlier.  She was having fevers as high as 103.  She was taking ibuprofen with improvement of fevers.  This persisted so she went to a minute clinic.  At minute clinic she had testing for influenza which was negative.  She had tested negative for COVID-19 at home.  She was referred to the [facility] ED for evaluation.  Emergency department evaluation showed heart rate in the 110s, temperature 99.4, and otherwise unremarkable vital signs.  Laboratory evaluation showed creatinine 1.35, total bilirubin 1.6, ferritin 522, iron 15, undetectable troponin, lactic acid 0.9, white blood cells 33.9, hemoglobin 10, reticulocyte count 4.7, negative testing for COVID/influenza/RSV, negative monoscreen, and unremarkable venous blood gas.  Chest x-ray showed no acute process.  CT of chest showed no pulmonary embolism or acute process.  CT of abdomen and pelvis showed bilateral pyelonephritis, right greater than left.  She was started on Rocephin and admitted to [facility].  She was boarding in the emergency department due to hospital bed crisis.  She developed fever to the 103.1 with heart rate up to the 140s.  She developed  Regarding: Colonoscopy prep  ----- Message from Eveline Tavares sent at 2/20/2018  6:10 AM CST -----  Patient Name: Mariel Olivera  Specialist or PCP:Chen  Pregnant (If Yes, how long?):  Symptoms:Colonoscopy prep concerns  Call Back #:[phone]  Is the patient’s permanent residence located in [place], [place], or a [facility]? Yes [facility] [address]  Call Center Account #:[id number]       "hypotension with blood pressure briefly in the 70s systolic.  Code rapid response was called.  She was given IV fluid bolus and her antibiotics were broadened to cefepime and Flagyl.  Peripheral Levophed was going to be started but ultimately was not.  She remained febrile.  She was transferred here to the ICU because there were no ICU beds available at [facility].  When I saw her she was feeling better.  She was still fatigued and with tachycardia.  Blood pressure had improved.     ASSESSMENT           Discharge Assessment  How are you doing now that you are home?: \" Better but still having some pain \"  How are your symptoms? (Red Flag symptoms escalate to triage hotline per guidelines): Improved;Unchanged  Do you feel your condition is stable enough to be safe at home until your provider visit?: Yes  Does the patient have their discharge instructions? : Yes  Does the patient have questions regarding their discharge instructions? : No  Were you started on any new medications or were there changes to any of your previous medications? : Yes  Does the patient have all of their medications?: Yes  Do you have questions regarding any of your medications? : No  Do you have all of your needed medical supplies or equipment (DME)?  (i.e. oxygen tank, CPAP, cane, etc.): Yes  Discharge follow-up appointment scheduled within 14 calendar days? : No  Is patient agreeable to assistance with scheduling? : No    Post-op ([facility] CTA Only)  If the patient had a surgery or procedure, do they have any questions for a nurse?: No             PLAN                                                      Outpatient Plan:  Your activity upon discharge: activity as tolerated  Follow this diet upon discharge: Orders Placed This Encounter  Regular Diet Adult  Follow-up and recommended labs and tests  Follow up with primary care provider, Physician No Ref-Primary, within 7 -14days for hospital follow- up. The following  labs/tests are " present. Mild edema in left upper arm. The mid incision of left upper arm is open without drema bond covered. A small amount drainage noted on dressing guaze. No redness, drainage noted. Non-tender, dry crust at edges of skin.     Assessment & Plan: Mr. Cobb dialysis access has matured well at this time point.    1. Keep left upper arm incision wound clean and dry  2. May apply topical antibiotic ointment as needed  3. Elevate left arm with support as tolerated  4. Continue left arm exercise with a squeeze ball as tolerated  5. Follow up in clinic in 2 weeks     We would like to see the patient back in the clinic in 2 weeks time to assess progress. The patient was counselled to contact our nurse coordinator, JAJA Correa (Sum), ANTOINE at [phone] with any questions or concerns.  Thank you for the opportunity to participate in Mr. Hoffman's care.        Modesto Guzman (Sum) MS, JAJA, CNS, [facility]  Dialysis Vascular Access/SOT Clinical Nurse Specialist    Solid Organ Transplant Service - [facility]      recommended: CBC.  Please establish care with hematology    No future appointments.      For any urgent concerns, please contact our 24 hour nurse triage line: [phone] ([phone])         Mckenna Lazo MA

## 2024-01-14 NOTE — DISCHARGE SUMMARY
[facility]  Hospitalist Discharge Summary      Date of Admission:  1/6/2024  Date of Discharge:  1/10/2024  4:53 PM  Discharging Provider: Nirali Ray MD  Discharge Service: Hospitalist Service    Discharge Diagnoses    Sepsis 2/2 E.coli UTI complicated by bilateral pyelonephritis  Chest  Tightness--resolved  Pleuritic chest pain---resolved  Acute kidney injury ---resolved  Sickle cell disease, doubt  Pain crisis      Clinically Significant Risk Factors          Follow-ups Needed After Discharge   Follow-up Appointments     Follow-up and recommended labs and tests       Follow up with primary care provider, Physician No Ref-Primary, within 7   -14days for hospital follow- up.  The following labs/tests are   recommended: CBC.    Please establish care with hematology.            Unresulted Labs Ordered in the Past 30 Days of this Admission     No orders found from 12/7/2023 to 1/7/2024.          Discharge Disposition   Discharged to home  Condition at discharge: Stable    Hospital Course    Shanel Rob is a 28 year old female with history of sickle cell disease, chest syndrome, stroke related to sickle cell disease as a child, and pneumonia.  She was visiting from [place].  She presented to the [facility] ED on 1/5/2024 with fever, tachycardia, chest pain, headache, back pain, and chills and was found to have sepsis 2/2 bilateral acute pyelonephritis. Started on IV ceftriaxone and broadened to cefepime when she clinically deteriorated. Ucx grew E.coli. Transitioned to PO Levofloxacin. Continue to feel improved through out  the hospital stay. WBC's improving  gradually but slowly. Will give a total 14 day abx course including IV antibiotics.     During the hospital stay no suspicion for sickle cell pain crisis. Hematology was consulted per patient  request as she had multiple questions.     Following problems were addressed during this hospital stay.     Sepsis due to acute pyelonephritis with E.  coli  -On admission patient was tachycardic, leukocytosis, and high fever.. Urine culture grew E. Coli.   -Had some itching and chest tightness without rash after cefepime. She has a PCN allergy. Change PCN to Levaquin.   -continue  p.o. Levaquin 750 mg daily.  Plan for a total 14-day antibiotic course including IV antibiotics.  -blood cultures NGTD  -Pain management with Tylenol 975 mg 3 times daily and oxycodone 5 to 10 mg every 4 hours as needed       Addendum:  Patient requested a prophylactic for yeast infections as she often gets them with antibiotics.  Received fluconazole on 1/7. Another prescription was provided per patient  request at  The time of discharge to use if needed.      Chest tightness-----resolved  Pleuritic chest pain----resolved  Reported mid chest tightness as well as pleuritic chest pain. Noted to have chest wall tenderness on palpation. On RA. Not tachypneic. CTA on admission without PE. Lovenox was ordered but patient has been refusing to take it. Per patient she is ambulating good and gets out of bed 5-6 times per day. Elevated d-dimer at 4.78. US DVT negative.        Acute kidney injury---resolved  -Creatinine elevated at 1.35 on admission.  -Likely due to relative volume depletion.  Improved with IVF     Sickle cell disease, doubt acute crisis  Per patient she does not get sickle cell pain crisis very often.  Last time she had pain episodes may be 4 to 5 months back which she was able to manage with good hydration at home.  Per patient her last hospitalization was about a decade ago for sickle cell pain crisis.  She is from [place] and visiting [place] currently.  She wanted to speak with hematology about her sickle cell disease before getting on plane back to home. Hematology consulted.       Consultations This Hospital Stay   HEMATOLOGY & ONCOLOGY IP CONSULT    Code Status   Prior    Time Spent on this Encounter   I, Nirali Ray MD, personally saw the patient today and spent greater  than 30 minutes discharging this patient.       Nirali Ray MD  [facility]  [address]  [facility] [address]  Phone: [phone]  Fax: [phone]  ______________________________________________________________________    Physical Exam   Vital Signs:                    Weight: 133 lbs 1.6 oz  Constitutional: awake, alert, cooperative, no apparent distress, and appears stated age  Eyes: anicteric sclera    ENT: normocepalic, without obvious abnormality  Respiratory: equal air entry bilaterally, no wheezing  Or crackles  Cardiovascular: normal rate, regula rhythm, n o murmur  GI: soft, non tender, non distended  Genitounirinary: bilateral flank tenderness L>R  Musculoskeletal: no lower extremity pitting edema present  Neurologic: Awake, alert, oriented to name, place and time.    Neuropsychiatric: Pleasant        Primary Care Physician   Physician No Ref-Primary    Discharge Orders      Follow-up and recommended labs and tests     Follow up with primary care provider, Physician No Ref-Primary, within 7 -14days for hospital follow- up.  The following labs/tests are recommended: CBC.    Please establish care with hematology.     Activity    Your activity upon discharge: activity as tolerated     Reason for your hospital stay    Dear Bridgett,     You were admitted to the hospital with a urinary tract infection involving both of your kidneys.  You were started on IV antibiotics.  During the course of hospital stay your condition improved hence you were transition to oral antibiotics.  You were also evaluated by hematology during the hospital stay and there were low concerns of sickle cell pain crisis.  If you continue taking antibiotics you should continue to feel improved but if you develop any fever, chills, feeling unwell, or recurrence of your symptoms or develop new symptoms please seek immediate medical care.    You were also started on iron tablet in the hospital.  Sometimes  iron pill can impair the absorption of antibiotics hence I would advise you to start taking iron tablet after you complete the antibiotic course.     Diet    Follow this diet upon discharge: Orders Placed This Encounter      Regular Diet Adult       Significant Results and Procedures   Most Recent 3 CBC's:Recent Labs   Lab Test 01/10/24  0721 01/09/24  0758 01/08/24  0602   WBC 20.6* 18.6* 24.1*   HGB 7.5* 7.4* 7.4*   MCV 83 82 83   * 388 302       Discharge Medications   Discharge Medication List as of 1/10/2024  3:23 PM      START taking these medications    Details   ferrous sulfate (FEROSUL) 325 (65 Fe) MG tablet Take 1 tablet (325 mg) by mouth every other day for 30 days, Disp-15 tablet, R-0, E-Prescribe      fluconazole (DIFLUCAN) 150 MG tablet Take 1 tablet (150 mg) by mouth once as needed (if develop symptoms of vaginal candidiasis like itching, increasing discharge, or malodorous discharge), Disp-1 tablet, R-0, E-Prescribe      folic acid (FOLVITE) 1 MG tablet Take 1 tablet (1 mg) by mouth daily, Disp-30 tablet, R-0, E-Prescribe      lactobacillus rhamnosus, GG, (CULTURELL) capsule Take 1 capsule by mouth daily, Disp-30 capsule, R-0, Local Print      levofloxacin (LEVAQUIN) 750 MG tablet Take 1 tablet (750 mg) by mouth daily for 9 days, Disp-9 tablet, R-0, E-Prescribe      methocarbamol (ROBAXIN) 500 MG tablet Take 1 tablet (500 mg) by mouth 4 times daily as needed for muscle spasms, Disp-28 tablet, R-0, E-Prescribe      ondansetron (ZOFRAN ODT) 4 MG ODT tab Take 1 tablet (4 mg) by mouth every 6 hours as needed for nausea or vomiting, Disp-20 tablet, R-0, E-Prescribe      oxyCODONE (ROXICODONE) 5 MG tablet Take 1-2 tablets (5-10 mg) by mouth every 4 hours as needed for severe pain, Disp-10 tablet, R-0, Local Print      polyethylene glycol (MIRALAX) 17 GM/Dose powder Take 17 g by mouth daily as needed for constipation, Disp-510 g, R-0, E-Prescribe         CONTINUE these medications which have NOT  CHANGED    Details   loratadine (CLARITIN) 10 MG tablet Take 10 mg by mouth daily, Historical      Multiple Vitamins-Minerals (MULTIVITAMIN GUMMIES WOMENS) CHEW Take 1 Piece by mouth daily, Historical         STOP taking these medications       ceFEPIme (MAXIPIME) 2 g vial Comments:   Reason for Stopping:         metroNIDAZOLE (FLAGYL) 5 mg/mL infusion Comments:   Reason for Stopping:             Allergies   Allergies   Allergen Reactions     Amoxicillin Anaphylaxis     Tolerated ceftriaxone (1/6/24), cefepime (1/6/24)